# Patient Record
Sex: MALE | Race: BLACK OR AFRICAN AMERICAN | NOT HISPANIC OR LATINO | URBAN - METROPOLITAN AREA
[De-identification: names, ages, dates, MRNs, and addresses within clinical notes are randomized per-mention and may not be internally consistent; named-entity substitution may affect disease eponyms.]

---

## 2019-04-07 ENCOUNTER — EMERGENCY (EMERGENCY)
Facility: HOSPITAL | Age: 74
LOS: 1 days | Discharge: HOME | End: 2019-04-07
Attending: STUDENT IN AN ORGANIZED HEALTH CARE EDUCATION/TRAINING PROGRAM
Payer: MEDICARE

## 2019-04-07 VITALS
SYSTOLIC BLOOD PRESSURE: 178 MMHG | DIASTOLIC BLOOD PRESSURE: 71 MMHG | HEART RATE: 67 BPM | TEMPERATURE: 98 F | RESPIRATION RATE: 18 BRPM | OXYGEN SATURATION: 93 %

## 2019-04-07 LAB
ALBUMIN SERPL ELPH-MCNC: 4.1 G/DL — SIGNIFICANT CHANGE UP (ref 3.3–5.2)
ALP SERPL-CCNC: 64 U/L — SIGNIFICANT CHANGE UP (ref 40–120)
ALT FLD-CCNC: 17 U/L — SIGNIFICANT CHANGE UP
ANION GAP SERPL CALC-SCNC: 12 MMOL/L — SIGNIFICANT CHANGE UP (ref 5–17)
AST SERPL-CCNC: 14 U/L — SIGNIFICANT CHANGE UP
BILIRUB SERPL-MCNC: <0.2 MG/DL — LOW (ref 0.4–2)
BUN SERPL-MCNC: 32 MG/DL — HIGH (ref 8–20)
CALCIUM SERPL-MCNC: 9.3 MG/DL — SIGNIFICANT CHANGE UP (ref 8.6–10.2)
CHLORIDE SERPL-SCNC: 107 MMOL/L — SIGNIFICANT CHANGE UP (ref 98–107)
CO2 SERPL-SCNC: 22 MMOL/L — SIGNIFICANT CHANGE UP (ref 22–29)
CREAT SERPL-MCNC: 1.89 MG/DL — HIGH (ref 0.5–1.3)
GLUCOSE SERPL-MCNC: 130 MG/DL — HIGH (ref 70–115)
HCT VFR BLD CALC: 32.7 % — LOW (ref 42–52)
HGB BLD-MCNC: 10.6 G/DL — LOW (ref 14–18)
MCHC RBC-ENTMCNC: 29.4 PG — SIGNIFICANT CHANGE UP (ref 27–31)
MCHC RBC-ENTMCNC: 32.4 G/DL — SIGNIFICANT CHANGE UP (ref 32–36)
MCV RBC AUTO: 90.6 FL — SIGNIFICANT CHANGE UP (ref 80–94)
PLATELET # BLD AUTO: 239 K/UL — SIGNIFICANT CHANGE UP (ref 150–400)
POTASSIUM SERPL-MCNC: 4.2 MMOL/L — SIGNIFICANT CHANGE UP (ref 3.5–5.3)
POTASSIUM SERPL-SCNC: 4.2 MMOL/L — SIGNIFICANT CHANGE UP (ref 3.5–5.3)
PROT SERPL-MCNC: 6.6 G/DL — SIGNIFICANT CHANGE UP (ref 6.6–8.7)
RBC # BLD: 3.61 M/UL — LOW (ref 4.6–6.2)
RBC # FLD: 16.2 % — HIGH (ref 11–15.6)
SODIUM SERPL-SCNC: 141 MMOL/L — SIGNIFICANT CHANGE UP (ref 135–145)
WBC # BLD: 6.4 K/UL — SIGNIFICANT CHANGE UP (ref 4.8–10.8)
WBC # FLD AUTO: 6.4 K/UL — SIGNIFICANT CHANGE UP (ref 4.8–10.8)

## 2019-04-07 PROCEDURE — 99284 EMERGENCY DEPT VISIT MOD MDM: CPT

## 2019-04-07 RX ORDER — SODIUM CHLORIDE 9 MG/ML
1000 INJECTION INTRAMUSCULAR; INTRAVENOUS; SUBCUTANEOUS ONCE
Qty: 0 | Refills: 0 | Status: COMPLETED | OUTPATIENT
Start: 2019-04-07 | End: 2019-04-07

## 2019-04-07 NOTE — ED ADULT NURSE NOTE - OBJECTIVE STATEMENT
pt c/o fall, pt was sitting up sleeping at side of bed, fell out of bed, pt landed on right side, abraison  note to left forehead, unknown loc, pt c/o pain to right side.

## 2019-04-07 NOTE — ED ADULT TRIAGE NOTE - CHIEF COMPLAINT QUOTE
c/o sitting edge of the bed and fall out of the bed yesterday am , denies +loc, abrasion to forehead, c/o right sided arm pain, and back pain

## 2019-04-07 NOTE — ED ADULT NURSE NOTE - NSIMPLEMENTINTERV_GEN_ALL_ED
Implemented All Fall Risk Interventions:  [place] to call system. Call bell, personal items and telephone within reach. Instruct patient to call for assistance. Room bathroom lighting operational. Non-slip footwear when patient is off stretcher. Physically safe environment: no spills, clutter or unnecessary equipment. Stretcher in lowest position, wheels locked, appropriate side rails in place. Provide visual cue, wrist band, yellow gown, etc. Monitor gait and stability. Monitor for mental status changes and reorient to person, place, and time. Review medications for side effects contributing to fall risk. Reinforce activity limits and safety measures with patient and family.

## 2019-04-08 VITALS
SYSTOLIC BLOOD PRESSURE: 149 MMHG | OXYGEN SATURATION: 97 % | HEART RATE: 77 BPM | RESPIRATION RATE: 18 BRPM | DIASTOLIC BLOOD PRESSURE: 66 MMHG | TEMPERATURE: 98 F

## 2019-04-08 LAB
ANISOCYTOSIS BLD QL: SLIGHT — SIGNIFICANT CHANGE UP
APTT BLD: 22 SEC — LOW (ref 27.5–36.3)
BLD GP AB SCN SERPL QL: SIGNIFICANT CHANGE UP
EOSINOPHIL NFR BLD AUTO: 4 % — SIGNIFICANT CHANGE UP (ref 0–6)
HYPOCHROMIA BLD QL: SLIGHT — SIGNIFICANT CHANGE UP
INR BLD: 0.95 RATIO — SIGNIFICANT CHANGE UP (ref 0.88–1.16)
LYMPHOCYTES # BLD AUTO: 18 % — LOW (ref 20–55)
MACROCYTES BLD QL: SLIGHT — SIGNIFICANT CHANGE UP
MICROCYTES BLD QL: SLIGHT — SIGNIFICANT CHANGE UP
MONOCYTES NFR BLD AUTO: 8 % — SIGNIFICANT CHANGE UP (ref 3–10)
NEUTROPHILS NFR BLD AUTO: 70 % — SIGNIFICANT CHANGE UP (ref 37–73)
PLAT MORPH BLD: NORMAL — SIGNIFICANT CHANGE UP
POIKILOCYTOSIS BLD QL AUTO: SLIGHT — SIGNIFICANT CHANGE UP
PROTHROM AB SERPL-ACNC: 10.9 SEC — SIGNIFICANT CHANGE UP (ref 10–12.9)
RBC BLD AUTO: ABNORMAL
TYPE + AB SCN PNL BLD: SIGNIFICANT CHANGE UP

## 2019-04-08 PROCEDURE — 93010 ELECTROCARDIOGRAM REPORT: CPT

## 2019-04-08 PROCEDURE — 96361 HYDRATE IV INFUSION ADD-ON: CPT

## 2019-04-08 PROCEDURE — 70450 CT HEAD/BRAIN W/O DYE: CPT | Mod: 26

## 2019-04-08 PROCEDURE — 74176 CT ABD & PELVIS W/O CONTRAST: CPT

## 2019-04-08 PROCEDURE — 86901 BLOOD TYPING SEROLOGIC RH(D): CPT

## 2019-04-08 PROCEDURE — 85730 THROMBOPLASTIN TIME PARTIAL: CPT

## 2019-04-08 PROCEDURE — 85610 PROTHROMBIN TIME: CPT

## 2019-04-08 PROCEDURE — 70450 CT HEAD/BRAIN W/O DYE: CPT

## 2019-04-08 PROCEDURE — 36415 COLL VENOUS BLD VENIPUNCTURE: CPT

## 2019-04-08 PROCEDURE — 72125 CT NECK SPINE W/O DYE: CPT

## 2019-04-08 PROCEDURE — 72125 CT NECK SPINE W/O DYE: CPT | Mod: 26

## 2019-04-08 PROCEDURE — 96374 THER/PROPH/DIAG INJ IV PUSH: CPT

## 2019-04-08 PROCEDURE — 71250 CT THORAX DX C-: CPT

## 2019-04-08 PROCEDURE — 99284 EMERGENCY DEPT VISIT MOD MDM: CPT | Mod: 25

## 2019-04-08 PROCEDURE — 74176 CT ABD & PELVIS W/O CONTRAST: CPT | Mod: 26

## 2019-04-08 PROCEDURE — 86850 RBC ANTIBODY SCREEN: CPT

## 2019-04-08 PROCEDURE — 86900 BLOOD TYPING SEROLOGIC ABO: CPT

## 2019-04-08 PROCEDURE — 71250 CT THORAX DX C-: CPT | Mod: 26

## 2019-04-08 PROCEDURE — 93005 ELECTROCARDIOGRAM TRACING: CPT

## 2019-04-08 PROCEDURE — 85027 COMPLETE CBC AUTOMATED: CPT

## 2019-04-08 PROCEDURE — 80053 COMPREHEN METABOLIC PANEL: CPT

## 2019-04-08 RX ORDER — OXYCODONE AND ACETAMINOPHEN 5; 325 MG/1; MG/1
1 TABLET ORAL ONCE
Qty: 0 | Refills: 0 | Status: DISCONTINUED | OUTPATIENT
Start: 2019-04-08 | End: 2019-04-08

## 2019-04-08 RX ORDER — MORPHINE SULFATE 50 MG/1
4 CAPSULE, EXTENDED RELEASE ORAL ONCE
Qty: 0 | Refills: 0 | Status: DISCONTINUED | OUTPATIENT
Start: 2019-04-08 | End: 2019-04-08

## 2019-04-08 RX ADMIN — SODIUM CHLORIDE 1000 MILLILITER(S): 9 INJECTION INTRAMUSCULAR; INTRAVENOUS; SUBCUTANEOUS at 01:25

## 2019-04-08 RX ADMIN — SODIUM CHLORIDE 1000 MILLILITER(S): 9 INJECTION INTRAMUSCULAR; INTRAVENOUS; SUBCUTANEOUS at 00:25

## 2019-04-08 RX ADMIN — OXYCODONE AND ACETAMINOPHEN 1 TABLET(S): 5; 325 TABLET ORAL at 05:37

## 2019-04-08 RX ADMIN — MORPHINE SULFATE 4 MILLIGRAM(S): 50 CAPSULE, EXTENDED RELEASE ORAL at 02:31

## 2019-04-08 NOTE — ED PROVIDER NOTE - OBJECTIVE STATEMENT
PT with SPMHX of multiple myeloma, HTN, CKD, presents to the ED with complaint of fall yesterday. PT states that he was sitting in bed and fell asleep sitting up and fell over hitting his head on his night stand. PT states that he had a sudden onset of pain in his Rt arm and shoulder and since then has been having increasing amount of chest and abd pain that is dull and painful. PT states that they have not done anything for the pain prior to coming to the ED. PT states that pain starts in his Neck and radiates to his Rt arm. PT states that all pan are mild in nature. PT denies fever, chills, weakness, numbness, tingling, HA, dizziness, cough, SOB, diff breathing, back pain, LOC.

## 2019-04-08 NOTE — ED PROVIDER NOTE - CARE PLAN
Principal Discharge DX:	Minor head injury  Secondary Diagnosis:	Back pain  Secondary Diagnosis:	Abdominal pain

## 2019-04-08 NOTE — ED PROVIDER NOTE - WEIGHT BEARING
Recommended modifications/Purpose of the nutrition education/Priority modifications/Survival information/Nutrition relationship to health/disease/Discussed need for adequate nutrition for survival. Encouraged Pt to abstain from alcohol use. able

## 2019-04-08 NOTE — ED PROVIDER NOTE - PROGRESS NOTE DETAILS
PT informed of kidney function, pt states that this is similar to baseline. PT informed of kidney function, pt states that this is similar to baseline. PT informed of all incidental CT findings and the importance to follow up to private oncologist.

## 2019-04-08 NOTE — ED PROVIDER NOTE - CLINICAL SUMMARY MEDICAL DECISION MAKING FREE TEXT BOX
PT with stable VS, no acute distress, non toxic appearing, tolerating PO in the ED, pt dale intact, no acute findings, pain controlled. pt will be DC home with follow up to PCP, educated about when to return to the ED if needed. PT verbalizes that he understands all instructions and results. Pt educated about the risks vs benefits of imaging at this time and agrees that not warranted for their symptoms, and PE.

## 2019-04-08 NOTE — ED PROVIDER NOTE - ATTENDING CONTRIBUTION TO CARE
75 yo male presents for evaluation of injuries from fall. I personally saw the patient with the PA, and completed the key components of the history and physical exam. I then discussed the management plan with the PA.

## 2019-04-08 NOTE — ED PROVIDER NOTE - NS ED ROS FT
ROS: CONTUSIONAL: Denies fever, chills, fatigue, wt loss. HEAD: Denies trauma, HA, Dizziness. EYE: Denies Acute visual changes, diplopia. ENMT: Denies change in hearing, tinnitus, epistaxis, difficulty swallowing, sore throat. CARDIO: Denies CP, palpitations, edema. RESP: Denies Cough, SOB , Diff breathing, hemoptysis. GI: Denies N/V, ABD pain, change in bowel movement. URINARY: Denies difficulty urinating, pelvic pain. MS:  Denies, weakness, decreased ROM, swelling. NEURO: Denies change in gait, seizures, loss of sensation, dizziness, confusion LOC.  PSY: NO SI/HI.

## 2019-08-28 ENCOUNTER — INPATIENT (INPATIENT)
Facility: HOSPITAL | Age: 74
LOS: 0 days | Discharge: HOME | DRG: 309 | End: 2019-08-29
Attending: HOSPITALIST | Admitting: HOSPITALIST
Payer: MEDICARE

## 2019-08-28 VITALS
RESPIRATION RATE: 18 BRPM | SYSTOLIC BLOOD PRESSURE: 93 MMHG | DIASTOLIC BLOOD PRESSURE: 54 MMHG | HEART RATE: 39 BPM | OXYGEN SATURATION: 96 %

## 2019-08-28 DIAGNOSIS — R00.1 BRADYCARDIA, UNSPECIFIED: ICD-10-CM

## 2019-08-28 DIAGNOSIS — I48.91 UNSPECIFIED ATRIAL FIBRILLATION: ICD-10-CM

## 2019-08-28 DIAGNOSIS — I10 ESSENTIAL (PRIMARY) HYPERTENSION: ICD-10-CM

## 2019-08-28 PROBLEM — N18.9 CHRONIC KIDNEY DISEASE, UNSPECIFIED: Chronic | Status: ACTIVE | Noted: 2019-04-08

## 2019-08-28 PROBLEM — C90.00 MULTIPLE MYELOMA NOT HAVING ACHIEVED REMISSION: Chronic | Status: ACTIVE | Noted: 2019-04-08

## 2019-08-28 LAB
ALBUMIN SERPL ELPH-MCNC: 3.3 G/DL — SIGNIFICANT CHANGE UP (ref 3.3–5.2)
ALP SERPL-CCNC: 54 U/L — SIGNIFICANT CHANGE UP (ref 40–120)
ALT FLD-CCNC: 19 U/L — SIGNIFICANT CHANGE UP
ANION GAP SERPL CALC-SCNC: 11 MMOL/L — SIGNIFICANT CHANGE UP (ref 5–17)
ANION GAP SERPL CALC-SCNC: 11 MMOL/L — SIGNIFICANT CHANGE UP (ref 5–17)
ANISOCYTOSIS BLD QL: SLIGHT — SIGNIFICANT CHANGE UP
APTT BLD: 33.4 SEC — SIGNIFICANT CHANGE UP (ref 27.5–36.3)
AST SERPL-CCNC: 20 U/L — SIGNIFICANT CHANGE UP
BASOPHILS # BLD AUTO: 0 K/UL — SIGNIFICANT CHANGE UP (ref 0–0.2)
BASOPHILS NFR BLD AUTO: 0 % — SIGNIFICANT CHANGE UP (ref 0–2)
BILIRUB SERPL-MCNC: 0.2 MG/DL — LOW (ref 0.4–2)
BLD GP AB SCN SERPL QL: SIGNIFICANT CHANGE UP
BUN SERPL-MCNC: 37 MG/DL — HIGH (ref 8–20)
BUN SERPL-MCNC: 39 MG/DL — HIGH (ref 8–20)
CALCIUM SERPL-MCNC: 7.8 MG/DL — LOW (ref 8.6–10.2)
CALCIUM SERPL-MCNC: 8.2 MG/DL — LOW (ref 8.6–10.2)
CHLORIDE SERPL-SCNC: 104 MMOL/L — SIGNIFICANT CHANGE UP (ref 98–107)
CHLORIDE SERPL-SCNC: 106 MMOL/L — SIGNIFICANT CHANGE UP (ref 98–107)
CO2 SERPL-SCNC: 21 MMOL/L — LOW (ref 22–29)
CO2 SERPL-SCNC: 22 MMOL/L — SIGNIFICANT CHANGE UP (ref 22–29)
CREAT SERPL-MCNC: 2.26 MG/DL — HIGH (ref 0.5–1.3)
CREAT SERPL-MCNC: 2.42 MG/DL — HIGH (ref 0.5–1.3)
DACRYOCYTES BLD QL SMEAR: SLIGHT — SIGNIFICANT CHANGE UP
ELLIPTOCYTES BLD QL SMEAR: SLIGHT — SIGNIFICANT CHANGE UP
EOSINOPHIL # BLD AUTO: 0.16 K/UL — SIGNIFICANT CHANGE UP (ref 0–0.5)
EOSINOPHIL NFR BLD AUTO: 2.6 % — SIGNIFICANT CHANGE UP (ref 0–6)
GIANT PLATELETS BLD QL SMEAR: PRESENT — SIGNIFICANT CHANGE UP
GLUCOSE SERPL-MCNC: 138 MG/DL — HIGH (ref 70–115)
GLUCOSE SERPL-MCNC: 181 MG/DL — HIGH (ref 70–115)
HCT VFR BLD CALC: 28.7 % — LOW (ref 39–50)
HGB BLD-MCNC: 9.2 G/DL — LOW (ref 13–17)
INR BLD: 1.39 RATIO — HIGH (ref 0.88–1.16)
LYMPHOCYTES # BLD AUTO: 0.64 K/UL — LOW (ref 1–3.3)
LYMPHOCYTES # BLD AUTO: 10.5 % — LOW (ref 13–44)
MACROCYTES BLD QL: SLIGHT — SIGNIFICANT CHANGE UP
MANUAL SMEAR VERIFICATION: SIGNIFICANT CHANGE UP
MCHC RBC-ENTMCNC: 29.3 PG — SIGNIFICANT CHANGE UP (ref 27–34)
MCHC RBC-ENTMCNC: 32.1 GM/DL — SIGNIFICANT CHANGE UP (ref 32–36)
MCV RBC AUTO: 91.4 FL — SIGNIFICANT CHANGE UP (ref 80–100)
MONOCYTES # BLD AUTO: 0.32 K/UL — SIGNIFICANT CHANGE UP (ref 0–0.9)
MONOCYTES NFR BLD AUTO: 5.3 % — SIGNIFICANT CHANGE UP (ref 2–14)
NEUTROPHILS # BLD AUTO: 5 K/UL — SIGNIFICANT CHANGE UP (ref 1.8–7.4)
NEUTROPHILS NFR BLD AUTO: 79.8 % — HIGH (ref 43–77)
NEUTS BAND # BLD: 1.8 % — SIGNIFICANT CHANGE UP (ref 0–8)
OVALOCYTES BLD QL SMEAR: SLIGHT — SIGNIFICANT CHANGE UP
PLAT MORPH BLD: NORMAL — SIGNIFICANT CHANGE UP
PLATELET # BLD AUTO: 226 K/UL — SIGNIFICANT CHANGE UP (ref 150–400)
POIKILOCYTOSIS BLD QL AUTO: SLIGHT — SIGNIFICANT CHANGE UP
POLYCHROMASIA BLD QL SMEAR: SLIGHT — SIGNIFICANT CHANGE UP
POTASSIUM SERPL-MCNC: 5.1 MMOL/L — SIGNIFICANT CHANGE UP (ref 3.5–5.3)
POTASSIUM SERPL-MCNC: 5.5 MMOL/L — HIGH (ref 3.5–5.3)
POTASSIUM SERPL-SCNC: 5.1 MMOL/L — SIGNIFICANT CHANGE UP (ref 3.5–5.3)
POTASSIUM SERPL-SCNC: 5.5 MMOL/L — HIGH (ref 3.5–5.3)
PROT SERPL-MCNC: 6 G/DL — LOW (ref 6.6–8.7)
PROTHROM AB SERPL-ACNC: 16.2 SEC — HIGH (ref 10–12.9)
RBC # BLD: 3.14 M/UL — LOW (ref 4.2–5.8)
RBC # FLD: 16.7 % — HIGH (ref 10.3–14.5)
RBC BLD AUTO: ABNORMAL
SODIUM SERPL-SCNC: 137 MMOL/L — SIGNIFICANT CHANGE UP (ref 135–145)
SODIUM SERPL-SCNC: 138 MMOL/L — SIGNIFICANT CHANGE UP (ref 135–145)
TROPONIN T SERPL-MCNC: 0.03 NG/ML — SIGNIFICANT CHANGE UP (ref 0–0.06)
TSH SERPL-MCNC: 1.99 UIU/ML — SIGNIFICANT CHANGE UP (ref 0.27–4.2)
WBC # BLD: 6.13 K/UL — SIGNIFICANT CHANGE UP (ref 3.8–10.5)
WBC # FLD AUTO: 6.13 K/UL — SIGNIFICANT CHANGE UP (ref 3.8–10.5)

## 2019-08-28 PROCEDURE — 70450 CT HEAD/BRAIN W/O DYE: CPT | Mod: 26

## 2019-08-28 PROCEDURE — 93010 ELECTROCARDIOGRAM REPORT: CPT

## 2019-08-28 PROCEDURE — 72125 CT NECK SPINE W/O DYE: CPT | Mod: 26

## 2019-08-28 PROCEDURE — 99223 1ST HOSP IP/OBS HIGH 75: CPT

## 2019-08-28 PROCEDURE — 99291 CRITICAL CARE FIRST HOUR: CPT

## 2019-08-28 PROCEDURE — 71045 X-RAY EXAM CHEST 1 VIEW: CPT | Mod: 26

## 2019-08-28 PROCEDURE — 99223 1ST HOSP IP/OBS HIGH 75: CPT | Mod: AI

## 2019-08-28 RX ORDER — GLUCAGON INJECTION, SOLUTION 0.5 MG/.1ML
1 INJECTION, SOLUTION SUBCUTANEOUS ONCE
Refills: 0 | Status: DISCONTINUED | OUTPATIENT
Start: 2019-08-28 | End: 2019-08-29

## 2019-08-28 RX ORDER — ENOXAPARIN SODIUM 100 MG/ML
120 INJECTION SUBCUTANEOUS EVERY 12 HOURS
Refills: 0 | Status: DISCONTINUED | OUTPATIENT
Start: 2019-08-28 | End: 2019-08-28

## 2019-08-28 RX ORDER — ACYCLOVIR SODIUM 500 MG
400 VIAL (EA) INTRAVENOUS
Qty: 0 | Refills: 0 | DISCHARGE

## 2019-08-28 RX ORDER — DEXAMETHASONE 0.5 MG/5ML
20 ELIXIR ORAL
Qty: 0 | Refills: 0 | DISCHARGE

## 2019-08-28 RX ORDER — SODIUM CHLORIDE 9 MG/ML
1000 INJECTION INTRAMUSCULAR; INTRAVENOUS; SUBCUTANEOUS ONCE
Refills: 0 | Status: COMPLETED | OUTPATIENT
Start: 2019-08-28 | End: 2019-08-28

## 2019-08-28 RX ORDER — POMALIDOMIDE 1 MG/1
1 CAPSULE ORAL
Qty: 0 | Refills: 0 | DISCHARGE

## 2019-08-28 RX ORDER — ROSUVASTATIN CALCIUM 5 MG/1
1 TABLET ORAL
Qty: 0 | Refills: 0 | DISCHARGE

## 2019-08-28 RX ORDER — INSULIN LISPRO 100/ML
VIAL (ML) SUBCUTANEOUS
Refills: 0 | Status: DISCONTINUED | OUTPATIENT
Start: 2019-08-28 | End: 2019-08-29

## 2019-08-28 RX ORDER — ROSUVASTATIN CALCIUM 5 MG/1
0 TABLET ORAL
Qty: 0 | Refills: 0 | DISCHARGE

## 2019-08-28 RX ORDER — ATROPINE SULFATE 0.1 MG/ML
0.5 SYRINGE (ML) INJECTION ONCE
Refills: 0 | Status: COMPLETED | OUTPATIENT
Start: 2019-08-28 | End: 2019-08-28

## 2019-08-28 RX ORDER — APIXABAN 2.5 MG/1
0 TABLET, FILM COATED ORAL
Qty: 0 | Refills: 0 | DISCHARGE

## 2019-08-28 RX ORDER — DEXTROSE 50 % IN WATER 50 %
25 SYRINGE (ML) INTRAVENOUS ONCE
Refills: 0 | Status: DISCONTINUED | OUTPATIENT
Start: 2019-08-28 | End: 2019-08-29

## 2019-08-28 RX ORDER — SODIUM CHLORIDE 9 MG/ML
1000 INJECTION, SOLUTION INTRAVENOUS
Refills: 0 | Status: DISCONTINUED | OUTPATIENT
Start: 2019-08-28 | End: 2019-08-29

## 2019-08-28 RX ORDER — ENOXAPARIN SODIUM 100 MG/ML
120 INJECTION SUBCUTANEOUS
Refills: 0 | Status: DISCONTINUED | OUTPATIENT
Start: 2019-08-28 | End: 2019-08-29

## 2019-08-28 RX ORDER — OMEPRAZOLE 10 MG/1
1 CAPSULE, DELAYED RELEASE ORAL
Qty: 0 | Refills: 0 | DISCHARGE

## 2019-08-28 RX ORDER — DEXTROSE 50 % IN WATER 50 %
15 SYRINGE (ML) INTRAVENOUS ONCE
Refills: 0 | Status: DISCONTINUED | OUTPATIENT
Start: 2019-08-28 | End: 2019-08-29

## 2019-08-28 RX ORDER — DEXTROSE 50 % IN WATER 50 %
12.5 SYRINGE (ML) INTRAVENOUS ONCE
Refills: 0 | Status: DISCONTINUED | OUTPATIENT
Start: 2019-08-28 | End: 2019-08-29

## 2019-08-28 RX ORDER — ACETAMINOPHEN 500 MG
500 TABLET ORAL
Qty: 0 | Refills: 0 | DISCHARGE

## 2019-08-28 RX ORDER — FLUTICASONE FUROATE AND VILANTEROL TRIFENATATE 100; 25 UG/1; UG/1
0 POWDER RESPIRATORY (INHALATION)
Qty: 0 | Refills: 0 | DISCHARGE

## 2019-08-28 RX ORDER — ATORVASTATIN CALCIUM 80 MG/1
80 TABLET, FILM COATED ORAL AT BEDTIME
Refills: 0 | Status: DISCONTINUED | OUTPATIENT
Start: 2019-08-28 | End: 2019-08-29

## 2019-08-28 RX ORDER — ASPIRIN/CALCIUM CARB/MAGNESIUM 324 MG
1 TABLET ORAL
Qty: 0 | Refills: 0 | DISCHARGE

## 2019-08-28 RX ORDER — ENOXAPARIN SODIUM 100 MG/ML
120 INJECTION SUBCUTANEOUS DAILY
Refills: 0 | Status: DISCONTINUED | OUTPATIENT
Start: 2019-08-28 | End: 2019-08-28

## 2019-08-28 RX ORDER — SITAGLIPTIN 50 MG/1
1 TABLET, FILM COATED ORAL
Qty: 0 | Refills: 0 | DISCHARGE

## 2019-08-28 RX ORDER — GABAPENTIN 400 MG/1
300 CAPSULE ORAL
Qty: 0 | Refills: 0 | DISCHARGE

## 2019-08-28 RX ADMIN — SODIUM CHLORIDE 1000 MILLILITER(S): 9 INJECTION INTRAMUSCULAR; INTRAVENOUS; SUBCUTANEOUS at 10:42

## 2019-08-28 RX ADMIN — ATORVASTATIN CALCIUM 80 MILLIGRAM(S): 80 TABLET, FILM COATED ORAL at 23:08

## 2019-08-28 RX ADMIN — Medication 0.5 MILLIGRAM(S): at 10:38

## 2019-08-28 NOTE — ED PROVIDER NOTE - CARE PLAN
Principal Discharge DX:	Symptomatic bradycardia  Secondary Diagnosis:	Atrial fibrillation with slow ventricular response

## 2019-08-28 NOTE — ED ADULT TRIAGE NOTE - CHIEF COMPLAINT QUOTE
Pt comes in for symptomatic bradycardia, states he has been weak x 3 days, syncopized this morning and was AMS, HR 38 upon EMS arrival, given 0.5mg atropine by EMS, HR 70 upon ED arrival, pt now AOx3

## 2019-08-28 NOTE — ED ADULT NURSE REASSESSMENT NOTE - NS ED NURSE REASSESS COMMENT FT1
Assumed patient care at 1137, report received from previous RN, charting as noted. Patient A&Ox4, complaining of feeling tired. Denies any chest pain, shortness of breath, nausea or dizziness. Cardiac monitor in place. Bradycardic. Respirations even & unlabored, denies any numbness or tingling. Saline lock in place, patent, negative s/s phlebitis or infiltration. IVF in progress, well tolerated. Oriented to call bell system, plan of care discussed, all questions answered. Will monitor.

## 2019-08-28 NOTE — CONSULT NOTE ADULT - SUBJECTIVE AND OBJECTIVE BOX
Electrophysiology Attending Consult Note    Cardiologist: Dr. Cedric Bledsoe  Electrophysiologist: Dr. Cm Damon    HPI:  Soren Fuentes is a 74 year old male with DM 2, paroxysmal AF (on Apixaban), hypertension, CKD (Cr 1.63-2.65), hyperparathyroidism, multiple myeloma s/p stem cell transplant/XRT, sickle cell trait, prostate cancer status post surgery who presented with syncope and bradycardia for which EP is consulted.    The patient states he woke up this morning and was sitting at the edge of the bed. He took his medications and the next thing he remembers is waking up on the floor with EMS assessing him. He denies having any chest pain, palpitations, dizziness, lightheadedness, dyspnea, fevers, chills, cough, nausea, vomiting, diarrhea, dysuria, hematuria, hematochezia, or melena. He does note that over the past three days he has had increased fatigue and tiredness. He manages his medications on his own and has them in a pill box. He does not feel he has taken any more medications than he is prescribed.    At his house, ECG by EMS reveals atrial flutter with slow ventricular response with HR to the 30-40s. He was given IV Atropine 0.5mg with minimal effect on his rate. Upon arrival to the ER, he was noted to be in AF with slow ventricular response and was given an additional IV Atropine 0.5mg x1 with minimal improvement in HR to the 50s.    PAST MEDICAL & SURGICAL HISTORY:  Diabetes type 2, controlled  Afib  HTN (hypertension)  Multiple myeloma  CKD (chronic kidney disease)  No significant past surgical history    REVIEW OF SYSTEMS:  CONSTITUTIONAL: No fever, + fatigue  EYES: No eye pain, visual disturbances  ENMT:  No changes in hearing, +rhinorrhea  NECK: No pain or stiffness  RESPIRATORY: No cough, wheezing, chills or hemoptysis; No shortness of breath  CARDIOVASCULAR: No chest pain, palpitations, dizziness, or leg swelling  GASTROINTESTINAL: No abdominal or epigastric pain. No nausea, vomiting, or hematemesis; No diarrhea or constipation. No melena or hematochezia.  GENITOURINARY: No dysuria, frequency, hematuria, or incontinence  NEUROLOGICAL: No headaches, memory loss, loss of strength, numbness, or tremors  SKIN: No itching, burning, rashes, or lesions   LYMPH NODES: No enlarged glands  ENDOCRINE: No heat or cold intolerance; No hair loss  MUSCULOSKELETAL: No joint pain or swelling; No muscle, back, or extremity pain  PSYCHIATRIC: No depression, anxiety, mood swings, or difficulty sleeping  HEME/LYMPH: No easy bruising, or bleeding gums  ALLERY AND IMMUNOLOGIC: No hives or eczema      MEDICATIONS  (STANDING):    MEDICATIONS  (PRN):      Allergies: lisinopril (Angioedema)    SOCIAL HISTORY:  Former tobacco use    FAMILY HISTORY:      Vital Signs Last 24 Hrs  T(C): 36.9 (28 Aug 2019 10:55), Max: 36.9 (28 Aug 2019 10:55)  T(F): 98.4 (28 Aug 2019 10:55), Max: 98.4 (28 Aug 2019 10:55)  HR: 53 (28 Aug 2019 11:16) (39 - 53)  BP: 96/68 (28 Aug 2019 11:16) (93/46 - 96/68)  BP(mean): --  RR: 18 (28 Aug 2019 11:16) (18 - 18)  SpO2: 100% (28 Aug 2019 11:16) (96% - 100%)    Physical Exam:  Constitutional: AAOx3, NAD  Neck: supple, No JVD  Cardiovascular: +S1S2 RRR, no murmurs, rubs, gallops   Pulmonary: CTA b/l, unlabored, no wheezes, rales. rhonci  Abdomen: +BS, soft NTND  Extremities: no edema b/l, +distal pulses b/l  Neuro: non focal, speech clear, MURCIA x 4    LABS:                        9.2    6.13  )-----------( 226      ( 28 Aug 2019 10:51 )             28.7   08-28    137  |  104  |  39.0<H>  ----------------------------<  181<H>  5.5<H>   |  22.0  |  2.42<H>    Ca    8.2<L>      28 Aug 2019 10:51    TPro  6.0<L>  /  Alb  3.3  /  TBili  0.2<L>  /  DBili  x   /  AST  20  /  ALT  19  /  AlkPhos  54  08-28  LIVER FUNCTIONS - ( 28 Aug 2019 10:51 )  Alb: 3.3 g/dL / Pro: 6.0 g/dL / ALK PHOS: 54 U/L / ALT: 19 U/L / AST: 20 U/L / GGT: x           PT/INR - ( 28 Aug 2019 10:51 )   PT: 16.2 sec;   INR: 1.39 ratio         PTT - ( 28 Aug 2019 10:51 )  PTT:33.4 sec        RADIOLOGY & ADDITIONAL STUDIES: Electrophysiology Attending Consult Note    Cardiologist: Dr. Cedric Bledsoe  Electrophysiologist: Dr. Cm Damon    HPI:  Soren Fuentes is a 74 year old male with DM 2, paroxysmal AF (on Apixaban), hypertension, CKD (Cr 1.63-2.65), hyperparathyroidism, multiple myeloma s/p stem cell transplant/XRT, sickle cell trait, prostate cancer status post surgery who presented with syncope and bradycardia for which EP is consulted.    The patient states he woke up this morning and was sitting at the edge of the bed. He took his medications and the next thing he remembers is waking up on the floor with EMS assessing him. He denies having any chest pain, palpitations, dizziness, lightheadedness, dyspnea, fevers, chills, cough, nausea, vomiting, diarrhea, dysuria, hematuria, hematochezia, or melena. He does note that over the past three days he has had increased fatigue and tiredness. He manages his medications on his own and has them in a pill box. He does not feel he has taken any more medications than he is prescribed.    At his house, ECG by EMS reveals atrial flutter with slow ventricular response with HR to the 30-40s. He was given IV Atropine 0.5mg with minimal effect on his rate. Upon arrival to the ER, he was noted to be in AF with slow ventricular response and was given an additional IV Atropine 0.5mg x1 with minimal improvement in HR to the 50s.    PAST MEDICAL & SURGICAL HISTORY:  Diabetes type 2, controlled  Afib  HTN (hypertension)  Multiple myeloma  CKD (chronic kidney disease)  No significant past surgical history    REVIEW OF SYSTEMS:  CONSTITUTIONAL: No fever, + fatigue, + sweats  EYES: No eye pain, visual disturbances  ENMT:  No changes in hearing, +rhinorrhea  NECK: No pain or stiffness  RESPIRATORY: No cough, wheezing, chills or hemoptysis; No shortness of breath  CARDIOVASCULAR: See HPI.  GASTROINTESTINAL: No abdominal or epigastric pain. No nausea, vomiting, or hematemesis; No diarrhea. No melena or hematochezia.  GENITOURINARY: No dysuria, hematuria  NEUROLOGICAL: No loss of strength, numbness  SKIN: No itching, burning, rashes, or lesions   ENDOCRINE: No heat or cold intolerance  MUSCULOSKELETAL: + muscle/back pain  PSYCHIATRIC: No depression, anxiety  HEME/LYMPH: No easy bruising, or bleeding gums      HOME MEDICATIONS:  Breo Ellipta:  (28 Aug 2019 11:10)  Crestor: 20 (28 Aug 2019 11:12)  Eliquis 5 mg oral tablet: BID (28 Aug 2019 11:09)  furosemide 40 mg oral tablet: 2 tablets BID (28 Aug 2019 11:11)  Januvia:  (28 Aug 2019 11:12)  losartan:  100mg QD (28 Aug 2019 11:13)  metoprolol succ: 50mg QD (28 Aug 2019 11:11)  Neurontin 300 mg oral capsule: TID  (28 Aug 2019 11:10)  omeprazole:  40mg QD (28 Aug 2019 11:12)  oxyCODONE 10 mg oral tablet, extended release: 1 tab(s) orally QID (28 Aug 2019 11:10)  Nifedipine ER 90mg QD  KCL 10mEq 2tab BID  Acyclovir 400mg TID    Allergies: lisinopril (Angioedema)    SOCIAL HISTORY:  Former tobacco use  Former drug use (cocaine, marijuana)    FAMILY HISTORY:  "heart disease" in the family    Vital Signs Last 24 Hrs  T(C): 36.9 (28 Aug 2019 10:55), Max: 36.9 (28 Aug 2019 10:55)  T(F): 98.4 (28 Aug 2019 10:55), Max: 98.4 (28 Aug 2019 10:55)  HR: 53 (28 Aug 2019 11:16) (39 - 53)  BP: 96/68 (28 Aug 2019 11:16) (93/46 - 96/68)  BP(mean): --  RR: 18 (28 Aug 2019 11:16) (18 - 18)  SpO2: 100% (28 Aug 2019 11:16) (96% - 100%)    Physical Exam:  Appearance: Normal, well nourished  HEENT: Dry oral mucosa, EOMI, sclera non-icteric, pale conjunctiva  Lymphatic: No cervical lymphadenopathy  Cardiovascular: Normal S1 S2, No JVD, No cardiac murmurs, No carotid bruits, No peripheral edema  Respiratory: Lungs clear to auscultation	  Psychiatry: A & O x 3, Mood & affect appropriate  Gastrointestinal:  Soft, Non-tender, + BS, no bruits  Skin: No rashes, No ecchymoses, No cyanosis  Neurologic: Grossly non-focal with full strength in all four extremities  Extremities: Normal range of motion, No clubbing, cyanosis or edema  Vascular: Warm and well perfused    LABS:             9.2    6.13  )-----------( 226      ( 28 Aug 2019 10:51 )             28.7       137  |  104  |  39.0<H>  ----------------------------<  181<H>  5.5<H>   |  22.0  |  2.42<H>    Ca    8.2<L>      28 Aug 2019 10:51    TPro  6.0<L>  /  Alb  3.3  /  TBili  0.2<L>  /  DBili  x   /  AST  20  /  ALT  19  /  AlkPhos  54    LIVER FUNCTIONS - ( 28 Aug 2019 10:51 )  Alb: 3.3 g/dL / Pro: 6.0 g/dL / ALK PHOS: 54 U/L / ALT: 19 U/L / AST: 20 U/L / GGT: x           PT/INR - ( 28 Aug 2019 10:51 )   PT: 16.2 sec;   INR: 1.39 ratio       PTT - ( 28 Aug 2019 10:51 )  PTT:33.4 sec    RADIOLOGY & ADDITIONAL STUDIES:    ECG 19: AF with slow ventricular response at 48 bpm.     OS ([place]) Records:  TTE 2017: LVEF 78%. Moderate diastolic dysfunction. LAd 3.9cm.    Regadenoson NST 19: Resting HR of 65 bpm rising to 74 bpm (NSR). Normal perfusion without ischemia or infarction. LVEF: 66%.    EC19: AF at 74 bpm. Non-specific ST-T wave changes in lateral leads.  EC19: NSR at 73 bpm with 1st degree AVB.    19:  CMP:  146 / 4.6 | 108 / 24 | 32 / 2.53 < 136  ALT: 14, AST: 13, ALP: 53, LDH: 212    CBC: 4.8 > 10 / 31.4 < 166

## 2019-08-28 NOTE — CONSULT NOTE ADULT - SUBJECTIVE AND OBJECTIVE BOX
Patient is a 74y old  Male who presents with a chief complaint of syncope, bradycardia.    HPI:  73yo male with PMH Afib on Eliquis, HTN, HLD, CKD, CHF (unknown EF), chronic BLE edema, multiple myeloma brought to ED s/p syncopal episode.  Patient unaware of events, states family found him on floor and he woke up with EMS, AMS, bradycardic HR in 30s, EMS administered Atropine 0.5mg, ED gave 2nd dose on arrival d/t HR 30-40s hypotensive 90s/40s.  Patient A&Ox3, states he is always weak but has had increased weakness and SOB x3days. Follows closely with cardio Dr. Damon at [facility], PMD Dr. Trejo.  He states he is very good at organizing his meds in a pill box, and does not think he took extra Toprol, but feels he could use some help.  Recent decrease in PO intake, appears extremely dry. Denies chest pain/pressure, palpitations, irregular and/or rapid heart beat, dizziness, orthopnea, PND, cough, n/v/d, hematuria, or hematochezia.     PAST MEDICAL & SURGICAL HISTORY:  Diabetes type 2, controlled  Afib  HTN (hypertension)  Multiple myeloma  CKD (chronic kidney disease)  No significant past surgical history      PREVIOUS DIAGNOSTIC TESTING:  none documented      Allergies    lisinopril (Angioedema)    Intolerances        MEDICATIONS  (STANDING):    MEDICATIONS  (PRN):    Home Medications:  Breo Ellipta:  (28 Aug 2019 11:10)  Crestor: 20 (28 Aug 2019 11:12)  Eliquis 5 mg oral tablet: BID (28 Aug 2019 11:09)  furosemide 40 mg oral tablet: 2 tablets BID (28 Aug 2019 11:11)  Januvia:  (28 Aug 2019 11:12)  losartan:  100mg QD (28 Aug 2019 11:13)  metoprolol succ: 50mg QD (28 Aug 2019 11:11)  Neurontin 300 mg oral capsule: TID  (28 Aug 2019 11:10)  omeprazole:  40mg QD (28 Aug 2019 11:12)  oxyCODONE 10 mg oral tablet, extended release: 1 tab(s) orally QID (28 Aug 2019 11:10)  Nifedipine ER 90mg QD  KCL 10mEq 2tab BID  Acyclovir 400mg TID    FAMILY HISTORY:  strong family Hx of Heart disease    SOCIAL HISTORY:    CIGARETTES: former    ALCOHOL: former    Drug: former heroin and cocaine     REVIEW OF SYSTEMS:  CONSTITUTIONAL: No fever, weight loss, or fatigue  EYES: No eye pain, visual disturbances, or discharge  ENMT:  No difficulty hearing, tinnitus, vertigo; No sinus or throat pain  NECK: No pain or stiffness  RESPIRATORY: as per HPI  CARDIOVASCULAR: as per HPI  GASTROINTESTINAL: No abdominal or epigastric pain. No nausea, vomiting, or hematemesis; No diarrhea or constipation. No melena or hematochezia.  GENITOURINARY: No dysuria, frequency, hematuria, or incontinence  NEUROLOGICAL: No headaches, memory loss, loss of strength, numbness, or tremors  SKIN: No itching, burning, rashes, or lesions   LYMPH Nodes: No enlarged glands  ENDOCRINE: No heat or cold intolerance; No hair loss  MUSCULOSKELETAL: No joint pain or swelling; No muscle, back, or extremity pain  PSYCHIATRIC: No depression, anxiety, mood swings, or difficulty sleeping  HEME/LYMPH: No easy bruising, or bleeding gums  ALLERGY AND IMMUNOLOGIC: No hives or eczema	    Vital Signs Last 24 Hrs  T(C): 36.9 (28 Aug 2019 10:55), Max: 36.9 (28 Aug 2019 10:55)  T(F): 98.4 (28 Aug 2019 10:55), Max: 98.4 (28 Aug 2019 10:55)  HR: 53 (28 Aug 2019 11:16) (39 - 53)  BP: 96/68 (28 Aug 2019 11:16) (93/46 - 96/68)  BP(mean): --  RR: 18 (28 Aug 2019 11:16) (18 - 18)  SpO2: 100% (28 Aug 2019 11:16) (96% - 100%)    Daily Height in cm: 182.88 (28 Aug 2019 10:55)    Daily     I&O's Detail      PHYSICAL EXAM:  Appearance: Normal, well nourished	  HEENT:  Dry oral mucosa, PERRL, EOMI, sclera non-icteric	  Lymphatic: No cervical lymphadenopathy  Cardiovascular: Normal S1 S2, No JVD, No cardiac murmurs, No carotid bruits. BLE 2+ pitting edema  Respiratory: Lungs clear to auscultation	  Psychiatry: A & O x 3, Mood & affect appropriate  Gastrointestinal:  Soft, Non-tender, + BS, no bruits	  Skin: No rashes, No ecchymoses, No cyanosis  Neurologic: Grossly non-focal with full strength in all four extremities  Extremities: Normal range of motion, No clubbing, cyanosis. BLE 2+ pitting edema  Vascular: Peripheral pulses palpable 2+ bilaterally      INTERPRETATION OF TELEMETRY: AFib SVR HR 40s-50s    ECG: AFib SVR 48bpm, unchanged from 4/8/19    LABS:                        9.2    6.13  )-----------( 226      ( 28 Aug 2019 10:51 )             28.7               PT/INR - ( 28 Aug 2019 10:51 )   PT: 16.2 sec;   INR: 1.39 ratio         PTT - ( 28 Aug 2019 10:51 )  PTT:33.4 sec    I&O's Summary    BNP Patient is a 74y old  Male who presents with a chief complaint of syncope, bradycardia.    HPI:  75yo male with PMH Afib on Eliquis, HTN, HLD, CKD, CHF (unknown EF), chronic BLE edema, multiple myeloma brought to ED s/p syncopal episode.  Patient unaware of events, states family found him on floor and he woke up with EMS, AMS, bradycardic HR in 30s, EMS administered Atropine 0.5mg, ED gave 2nd dose on arrival d/t HR 30-40s hypotensive 90s/40s.  Patient A&Ox3, states he is always weak but has had increased weakness and SOB x3days. Follows closely with cardio Dr. Damon at [facility], PMD Dr. Trejo.  He states he is very good at organizing his meds in a pill box, and does not think he took extra Toprol, but feels he could use some help.  Recent decrease in PO intake, appears extremely dry. Denies chest pain/pressure, palpitations, irregular and/or rapid heart beat, dizziness, orthopnea, PND, cough, n/v/d, hematuria, or hematochezia.     PAST MEDICAL & SURGICAL HISTORY:  Diabetes type 2, controlled  Afib  HTN (hypertension)  Multiple myeloma  CKD (chronic kidney disease)  No significant past surgical history    PREVIOUS DIAGNOSTIC TESTING:  none documented    Allergies  lisinopril (Angioedema)  Intolerances        MEDICATIONS  (STANDING):    MEDICATIONS  (PRN):    Home Medications:  Breo Ellipta:  (28 Aug 2019 11:10)  Crestor: 20 (28 Aug 2019 11:12)  Eliquis 5 mg oral tablet: BID (28 Aug 2019 11:09)  furosemide 40 mg oral tablet: 2 tablets BID (28 Aug 2019 11:11)  Januvia:  (28 Aug 2019 11:12)  losartan:  100mg QD (28 Aug 2019 11:13)  metoprolol succ: 50mg QD (28 Aug 2019 11:11)  Neurontin 300 mg oral capsule: TID  (28 Aug 2019 11:10)  omeprazole:  40mg QD (28 Aug 2019 11:12)  oxyCODONE 10 mg oral tablet, extended release: 1 tab(s) orally QID (28 Aug 2019 11:10)  Nifedipine ER 90mg QD  KCL 10mEq 2tab BID  Acyclovir 400mg TID    FAMILY HISTORY:  strong family Hx of Heart disease    SOCIAL HISTORY:    CIGARETTES: former    ALCOHOL: former    Drug: former heroin and cocaine     REVIEW OF SYSTEMS:  CONSTITUTIONAL: No fever, weight loss, or fatigue  EYES: No eye pain, visual disturbances, or discharge  ENMT:  No difficulty hearing, tinnitus, vertigo; No sinus or throat pain  NECK: No pain or stiffness  RESPIRATORY: as per HPI  CARDIOVASCULAR: as per HPI  GASTROINTESTINAL: No abdominal or epigastric pain. No nausea, vomiting, or hematemesis; No diarrhea or constipation. No melena or hematochezia.  GENITOURINARY: No dysuria, frequency, hematuria, or incontinence  NEUROLOGICAL: No headaches, memory loss, loss of strength, numbness, or tremors  SKIN: No itching, burning, rashes, or lesions   LYMPH Nodes: No enlarged glands  ENDOCRINE: No heat or cold intolerance; No hair loss  MUSCULOSKELETAL: No joint pain or swelling; No muscle, back, or extremity pain  PSYCHIATRIC: No depression, anxiety, mood swings, or difficulty sleeping  HEME/LYMPH: No easy bruising, or bleeding gums  ALLERGY AND IMMUNOLOGIC: No hives or eczema	    Vital Signs Last 24 Hrs  T(C): 36.9 (28 Aug 2019 10:55), Max: 36.9 (28 Aug 2019 10:55)  T(F): 98.4 (28 Aug 2019 10:55), Max: 98.4 (28 Aug 2019 10:55)  HR: 53 (28 Aug 2019 11:16) (39 - 53)  BP: 96/68 (28 Aug 2019 11:16) (93/46 - 96/68)  BP(mean): --  RR: 18 (28 Aug 2019 11:16) (18 - 18)  SpO2: 100% (28 Aug 2019 11:16) (96% - 100%)    Daily Height in cm: 182.88 (28 Aug 2019 10:55)    Daily     I&O's Detail      PHYSICAL EXAM:  Appearance: Normal, well nourished	  HEENT:  Dry oral mucosa, PERRL, EOMI, sclera non-icteric	  Lymphatic: No cervical lymphadenopathy  Cardiovascular: Normal S1 S2, No JVD, No cardiac murmurs, No carotid bruits. BLE 2+ pitting edema  Respiratory: Lungs clear to auscultation	  Psychiatry: A & O x 3, Mood & affect appropriate  Gastrointestinal:  Soft, Non-tender, + BS, no bruits	  Skin: No rashes, No ecchymoses, No cyanosis  Neurologic: Grossly non-focal with full strength in all four extremities  Extremities: Normal range of motion, No clubbing, cyanosis. BLE 2+ pitting edema  Vascular: Peripheral pulses palpable 2+ bilaterally      INTERPRETATION OF TELEMETRY: AFib SVR HR 40s-50s    ECG: AFib Ventricular Rates  48bpm, unchanged from 4/8/19    LABS:                        9.2    6.13  )-----------( 226      ( 28 Aug 2019 10:51 )             28.7     PT/INR - ( 28 Aug 2019 10:51 )   PT: 16.2 sec;   INR: 1.39 ratio    PTT - ( 28 Aug 2019 10:51 )  PTT:33.4 sec

## 2019-08-28 NOTE — H&P ADULT - NSICDXPASTMEDICALHX_GEN_ALL_CORE_FT
PAST MEDICAL HISTORY:  Afib     CKD (chronic kidney disease)     Diabetes type 2, controlled     HTN (hypertension)     Multiple myeloma

## 2019-08-28 NOTE — ED PROVIDER NOTE - PHYSICAL EXAMINATION
Gen: Well appearing male, tired and slow to answer but in NAD  Head: NC/AT  Neck: trachea midline  Resp:  No distress, CTAB  CV: irregular, bradycardic  GI: soft, NTND  Ext: no deformities, no calf ttp. No edema.   Neuro:  A&O appears non focal  Skin:  Warm and dry as visualized  Psych:  Normal affect and mood

## 2019-08-28 NOTE — ED ADULT NURSE NOTE - ED STAT RN HANDOFF DETAILS
Patient transported to unit with 2 RN's due to no transport/delay in transport. Patient A&Ox4, denies any pain or discomfort. Denies any chest pain, shortness of breath, nausea or dizziness. Cardiac monitor in place. Saline lock in place, patent, negative s/s phlebitis or infiltration. Report given to UMM Ott at bedside. Made aware of pacer pads in place. Patient placed in bed by this RN, placed in position of comfort.

## 2019-08-28 NOTE — CONSULT NOTE ADULT - ASSESSMENT
75 y/o male pmhx DM2, afib on Eliquis , HTN, multiple myeloma s/p stem cell transplant, CKD, sickle cell trait who presented today after being found down at home. Patient admitted w/ symptomatic bradycardia. At this time patient has 's, and HR sinus rosie 50's w/ out any transcutaneous pacing, 73 y/o male pmhx DM2, afib on Eliquis , HTN, multiple myeloma s/p stem cell transplant, CKD, sickle cell trait who presented today after being found down at home. Patient admitted w/ symptomatic bradycardia. At this time patient has 's, and HR sinus rosie 50's w/ out any transcutaneous pacing, he does not require an ICU admission. Please re consult as needed. Case discussed w/ ICU Dr. Marsh.     Plan:  -Symptomatic Bradycardia, unclear etiology possibly due to accidentally taking to many BB.  Has normal mentation, good perfusion.   Telemetry  Monitoring. Keep Zoll pads attached. Atropine at beside. Hold any AV tiffanie blockers.  EP following, possible PPM   -HTN, Normotensive now, hold antihypertensives.   -Afib, started on full dose Lovenox as per EP

## 2019-08-28 NOTE — H&P ADULT - NSHPSOCIALHISTORY_GEN_ALL_CORE
ex smoker, no etoh, ex cocaine & marijuana user    Family hx of brain ca in daughter & prostate ca in brother

## 2019-08-28 NOTE — CONSULT NOTE ADULT - ATTENDING COMMENTS
Patient seen and examined.  A&P amended above.  Newly diagnosed paroxysmal atrial fibrillation (6/2019) presents with symptomatic bradycardia.  TTE pending.  Hold metoprolol and nifedipine. Prior ischemic workup in [facility]  2 months ago - negative for ongoing ischemia.  EP following - will defer management to Dr. Fu.  Will sign off.  Please call with any questions.

## 2019-08-28 NOTE — ED PROVIDER NOTE - OBJECTIVE STATEMENT
74M h/o MM, HTN, afib on eliquis/metoprolol, DM p/w worsened weakness x 3 days and fall/syncope today. Pt is unsure what happened this morning, remembers waking up tired, took his meds and woke up on the floor with a roommate waking him. His HR with ES was in the 30s and irregular. Pt was altered with EMS. Received 0.5mg atropine and some IVF and HR improved to 60s, mental status improved. Pt currently with HR ranging from 35-55. Denies CP, fever, cough, SOB, pain, palpitations, nausea, vomiting.

## 2019-08-28 NOTE — CONSULT NOTE ADULT - ASSESSMENT
In summary, this is a 74 year old male with DM 2, paroxysmal AF (on Apixaban), hypertension, CKD (Cr 1.63-2.65), hyperparathyroidism, multiple myeloma s/p stem cell transplant/XRT, sickle cell trait, prostate cancer status post surgery who presented with syncope and AF with slow ventricular response for which EP is consulted. The patient appears to have symptomatic bradycardia. His renal function is within the range he stays in based on the past several months' lab work. He does appear to be quite dry today on physical exam, though he states he has good appetite. We will hold his beta blocker and see how his HR improves. If his HR normalizes (ie >60 bpm without symptoms) after holding BB, then we can avoid pacemaker implantation. If he remains in slow AF, despite holding BB, then he would benefit with pacemaker implantation. Ideally, a leadless pacemaker seems best considering his multiple comorbidities.    His CHADS2-VASc is 3 (Agex1, DM, HTN) and so he should continue to be anticoagulated. Since he may require a procedure, recommend discontinuing eliquis and using therapeutic enoxaparin instead.    Recommendations:  - Hold beta blockers  - Hold eliquis, change to renally adjusted therapeutic enoxaparin  - Monitor on telemetry  - Possible pacemaker if patient does not have improvement in HR    Thank you for this consult. We will follow with you.    Campos Fu MD  Clinical Cardiac Electrophysiology

## 2019-08-28 NOTE — ED PROVIDER NOTE - CLINICAL SUMMARY MEDICAL DECISION MAKING FREE TEXT BOX
Pt with symptomatic bradycardia, in afib, has not taken extra medications. Also with syncope on eliquis.   - labs, pacer pads, [place] cards, cxr, CTH/CT c spine, IVF, reassess. Admit, likely needs PPM

## 2019-08-28 NOTE — ED ADULT NURSE REASSESSMENT NOTE - NS ED NURSE REASSESS COMMENT FT1
Patient awake alert, and oriented times 3, breathing unlabored.  No complaints of pain at this time, cardiac monitor continued.  Cardiologist at bedside evaluating patient.  IV fluids infusing without difficulty

## 2019-08-28 NOTE — ED ADULT NURSE NOTE - PMH
Afib    CKD (chronic kidney disease)    Diabetes type 2, controlled    HTN (hypertension)    Multiple myeloma

## 2019-08-28 NOTE — ED PROVIDER NOTE - PROGRESS NOTE DETAILS
Hospitalist, Pal paged: Pt's family is here, would like to speak with you when you get the chance, thanks.   Tong GHOSH: No improvement after atropine. [facility] and EP consulted. Tong GHOSH: pt seen by MICU, does not require ICU level care at this point. Accepted by hospitalist to stepdown.

## 2019-08-28 NOTE — H&P ADULT - ASSESSMENT
74 year old male with DM 2, paroxysmal AF (on Apixaban), hypertension, CKD (Cr 1.63-2.65), hyperparathyroidism, multiple myeloma s/p stem cell transplant/XRT, sickle cell trait, prostate cancer status post surgery who presented with syncope and bradycardia. The patient states he woke up this morning and was sitting at the edge of the bed. He took his medications and the next thing he remembers is waking up on the floor with EMS assessing him. He denies having any chest pain, palpitations, dizziness, lightheadedness, dyspnea, fevers, chills, cough, nausea, vomiting, diarrhea, dysuria, hematuria, hematochezia, or melena. He does note that over the past three days he has had increased fatigue and tiredness. He manages his medications on his own and has them in a pill box. He does not feel he has taken any more medications than he is prescribed. At his house, ECG by EMS reveals atrial flutter with slow ventricular response with HR to the 30-40s. He was given IV Atropine 0.5mg with minimal effect on his rate. Upon arrival to the ER, he was noted to be in AF with slow ventricular response and was given an additional IV Atropine 0.5mg x1 with minimal improvement in HR to the 50s. Currently pt's BP is 119/50 & HR is 63. Currently denying any symptoms. Pt is on Nifedipine & metoprolol at home & denies any med changes recently. Admits taking extra tizanidine & oxycodone prior to the event due to body ache.         Syncope likely from combination of BB, CCB, opiates & Tizanidine-  Admit to tele  Currently pt's BP is 119/50 & HR is 63.  - Tele monitoring overnight  - EKG essentially unchanged from prior  - AFib w/  bradycardia 50-60's -- no need for pacing at this time.   - Hold Nifedipine & metoprolol   - TTE  Cardio & EPS appreciated    hx of HTN  -  hypotensive on admission, now normalized, hold all BP meds for now. Resume prn    HLD  - c/w statin    DM 2-  A1c  c/w home meds    Paroxysmal AF (on Apixaban)-  Hold BB & eliquis  Full dose lovenox incase pt needs PPM in am    CKD stage 3  Baseline Cr 1.8, now 2.26  Avoid nephrotoxic agents  Hold lasix & losartan tonight  f/u BMP    DVT ppx: on lovenox    Pt does not know what meds he take & the dosages. Friend to bring them tonight 74 year old male with DM 2, paroxysmal AF (on Apixaban), hypertension, CKD (Cr 1.63-2.65), hyperparathyroidism, multiple myeloma s/p stem cell transplant/XRT, sickle cell trait, prostate cancer status post surgery who presented with syncope and bradycardia. The patient states he woke up this morning and was sitting at the edge of the bed. He took his medications and the next thing he remembers is waking up on the floor with EMS assessing him. He denies having any chest pain, palpitations, dizziness, lightheadedness, dyspnea, fevers, chills, cough, nausea, vomiting, diarrhea, dysuria, hematuria, hematochezia, or melena. He does note that over the past three days he has had increased fatigue and tiredness. He manages his medications on his own and has them in a pill box. He does not feel he has taken any more medications than he is prescribed. At his house, ECG by EMS reveals atrial flutter with slow ventricular response with HR to the 30-40s. He was given IV Atropine 0.5mg with minimal effect on his rate. Upon arrival to the ER, he was noted to be in AF with slow ventricular response and was given an additional IV Atropine 0.5mg x1 with minimal improvement in HR to the 50s. Currently pt's BP is 119/50 & HR is 63. Currently denying any symptoms. Pt is on Nifedipine & metoprolol at home & denies any med changes recently. Admits taking extra tizanidine & oxycodone prior to the event due to body ache.         Syncope likely from combination of BB, CCB, opiates & Tizanidine-  Admit to tele  Currently pt's BP is 119/50 & HR is 63.  - Tele monitoring overnight  - EKG essentially unchanged from prior  - AFib w/  bradycardia 50-60's -- no need for pacing at this time.   - Hold Nifedipine 90 qD & metoprolol 25 qD  -Hold opiates & Tizanidine  - TTE  Cardio & EPS appreciated    hx of HTN  -  hypotensive on admission, now normalized, hold all BP meds for now. Resume prn    HLD  - c/w statin    DM 2-  A1c  ISS  c/w home meds sitagliptin 50 on d/c    Paroxysmal AF (on Apixaban)-  Hold BB & eliquis  Full dose lovenox incase pt needs PPM in am    CKD stage 3  Baseline Cr 1.8, now 2.26  Avoid nephrotoxic agents  Hold lasix 40 & losartan 100 tonight  f/u BMP    DVT ppx: on lovenox 74 year old male with DM 2, paroxysmal AF (on Apixaban), hypertension, CKD (Cr 1.63-2.65), hyperparathyroidism, multiple myeloma s/p stem cell transplant/XRT, sickle cell trait, prostate cancer status post surgery who presented with syncope and bradycardia. The patient states he woke up this morning and was sitting at the edge of the bed. He took his medications and the next thing he remembers is waking up on the floor with EMS assessing him. He denies having any chest pain, palpitations, dizziness, lightheadedness, dyspnea, fevers, chills, cough, nausea, vomiting, diarrhea, dysuria, hematuria, hematochezia, or melena. He does note that over the past three days he has had increased fatigue and tiredness. He manages his medications on his own and has them in a pill box. He does not feel he has taken any more medications than he is prescribed. At his house, ECG by EMS reveals atrial flutter with slow ventricular response with HR to the 30-40s. He was given IV Atropine 0.5mg with minimal effect on his rate. Upon arrival to the ER, he was noted to be in AF with slow ventricular response and was given an additional IV Atropine 0.5mg x1 with minimal improvement in HR to the 50s. Currently pt's BP is 119/50 & HR is 63. Currently denying any symptoms. Pt is on Nifedipine & metoprolol at home & denies any med changes recently. Admits taking extra tizanidine & oxycodone prior to the event due to body ache.         Syncope likely from combination of BB, CCB, opiates & Tizanidine-  Admit to tele  Currently pt's BP is 119/50 & HR is 63.  - Tele monitoring overnight  - EKG essentially unchanged from prior  - AFib w/  bradycardia 50-60's -- no need for pacing at this time.   - Hold Nifedipine 90 qD & metoprolol 25 qD  -Hold opiates & Tizanidine  - TTE  Cardio & EPS appreciated    hx of HTN  -  hypotensive on admission, now normalized, hold all BP meds for now. Resume prn    HLD  - c/w statin    DM 2-  A1c  ISS  c/w home meds sitagliptin 50 on d/c    Paroxysmal AF (on Apixaban)-  Hold BB & eliquis  Full dose lovenox incase pt needs PPM in am    LUIZ on CKD stage 3  Baseline Cr 1.8, now 2.26  Avoid nephrotoxic agents  Hold lasix 40 & losartan 100 tonight  f/u BMP    DVT ppx: on lovenox

## 2019-08-28 NOTE — PHARMACOTHERAPY INTERVENTION NOTE - COMMENTS
Patient with CrCl of 38, Scr 2.26 and downtrending, started on renally dosed lovenox 120 mG qday for full anticoagulation. Spoke with overnight PA to recommend 120 mG BID dosing due to CrCl > 30.

## 2019-08-28 NOTE — ED ADULT NURSE REASSESSMENT NOTE - NS ED NURSE REASSESS COMMENT FT1
Patient A&Ox4 complaining of being hungry. MD aware, patient OK to eat. Remains on pacer pads attached to monitor. Hard monitor in place, bradycardic. Saline lock in place, patent, negative s/s phlebitis or infiltration. Plan of care discussed, all questions answered. Will continue to monitor.

## 2019-08-28 NOTE — ED ADULT NURSE NOTE - OBJECTIVE STATEMENT
Patient found laying in stretcher, awake alert, and oriented times 3, breathing unlabored.  Patient has no complaints at this time.  Patient complaining of intermittent weakness and " being tired"  for 1 year however has got worse the past 3 days.  Patient was found on floor by friend this morning.  patient does not remember incident.  unknown if hit head.  carpet floor. Patient found laying in stretcher, awake alert, and oriented times 3, breathing unlabored.  Patient has no complaints at this time.  Patient complaining of intermittent weakness and " being tired"  for 1 year however has got worse the past 3 days.  Patient was found on floor by friend this morning.  patient does not remember incident.  unknown if hit head.  carpet floor.  Bilateral lower extremity edema noted which is chronic as per patient.

## 2019-08-28 NOTE — ED ADULT NURSE NOTE - NSIMPLEMENTINTERV_GEN_ALL_ED
Implemented All Fall with Harm Risk Interventions:  [place] to call system. Call bell, personal items and telephone within reach. Instruct patient to call for assistance. Room bathroom lighting operational. Non-slip footwear when patient is off stretcher. Physically safe environment: no spills, clutter or unnecessary equipment. Stretcher in lowest position, wheels locked, appropriate side rails in place. Provide visual cue, wrist band, yellow gown, etc. Monitor gait and stability. Monitor for mental status changes and reorient to person, place, and time. Review medications for side effects contributing to fall risk. Reinforce activity limits and safety measures with patient and family. Provide visual clues: red socks.

## 2019-08-28 NOTE — ED ADULT NURSE REASSESSMENT NOTE - NS ED NURSE REASSESS COMMENT FT1
Pacer pads placed on patients chest, code cart at beside. Cardiac monitor in place, remains bradycardic at 44-56. Will continue to monitor.

## 2019-08-28 NOTE — CHART NOTE - NSCHARTNOTEFT_GEN_A_CORE
PA NOTR-MED     Called by Emergency Dept Pharmacist suggesting Pts Lovenox dose should be 120mg BID instead of 120 mg Daily  Pt is 73 yo Male admitted for syncope with bradycardia with h/o paroxysmal Afib and CKD  who was on Eliquis 5 mg po daily at home    In MD's notes it states Pt should be fully dosed with lovenox   Pts last lab results: GFR- 32 Creat-2.26  Changed Lovenox order from 120 mg Daily to Lovenox 120 mg BID  Discussed case with Dr Price-Hospitalist and she agrees with plan

## 2019-08-28 NOTE — CONSULT NOTE ADULT - SUBJECTIVE AND OBJECTIVE BOX
Patient is a 74y old  Male who presents with a chief complaint of Syncope (28 Aug 2019 11:59)      75 y/o male pmhx DM2, afib on Eliquis , HTN, multiple myeloma s/p stem cell transplant, CKD, sickle cell trait who presented today after being found down at home. As per patient he said he woke up this morning w/ some back pain and took a oxycodone and zantac. The next thing he knew EMS was waking him up. EMS gave him 0.5mg Atropine for HR 30's, received additional 0.5mg Atropine in ED. He states he did not mix up his medications/ take any additional metoprolol.     Patient was seen and examined in ED, sitting in bed w/ no complaints. Transcutaneous pads attached, not requiring any passing at this time. /71.     PAST MEDICAL & SURGICAL HISTORY:  Diabetes type 2, controlled  Afib  HTN (hypertension)  Multiple myeloma  CKD (chronic kidney disease)  No significant past surgical history    Allergies    lisinopril (Angioedema)    Intolerances      FAMILY HISTORY:      Review of Systems:  CONSTITUTIONAL: No fever, chills, or fatigue  EYES: No eye pain, visual disturbances, or discharge  ENMT:  No difficulty hearing, tinnitus, vertigo; No sinus or throat pain  NECK: No pain or stiffness  RESPIRATORY: No cough, wheezing, chills or hemoptysis; No shortness of breath  CARDIOVASCULAR: No chest pain, palpitations, dizziness, or leg swelling  GASTROINTESTINAL: No abdominal or epigastric pain. No nausea, vomiting, or hematemesis; No diarrhea or constipation. No melena or hematochezia.  GENITOURINARY: No dysuria, frequency, hematuria, or incontinence  NEUROLOGICAL: No headaches, memory loss, loss of strength, numbness, or tremors  SKIN: No itching, burning, rashes, or lesions   MUSCULOSKELETAL: No joint pain or swelling; No muscle, back, or extremity pain  PSYCHIATRIC: No depression, anxiety, mood swings, or difficulty sleeping      Medications:              ICU Vital Signs Last 24 Hrs  T(C): 36.9 (28 Aug 2019 10:55), Max: 36.9 (28 Aug 2019 10:55)  T(F): 98.4 (28 Aug 2019 10:55), Max: 98.4 (28 Aug 2019 10:55)  HR: 53 (28 Aug 2019 11:16) (39 - 53)  BP: 96/68 (28 Aug 2019 11:16) (93/46 - 96/68)  RR: 18 (28 Aug 2019 11:16) (18 - 18)  SpO2: 100% (28 Aug 2019 11:16) (96% - 100%)    Vital Signs Last 24 Hrs  T(C): 36.9 (28 Aug 2019 10:55), Max: 36.9 (28 Aug 2019 10:55)  T(F): 98.4 (28 Aug 2019 10:55), Max: 98.4 (28 Aug 2019 10:55)  HR: 53 (28 Aug 2019 11:16) (39 - 53)  BP: 96/68 (28 Aug 2019 11:16) (93/46 - 96/68)  RR: 18 (28 Aug 2019 11:16) (18 - 18)  SpO2: 100% (28 Aug 2019 11:16) (96% - 100%)        I&O's Detail        LABS:                        9.2    6.13  )-----------( 226      ( 28 Aug 2019 10:51 )             28.7     08-28    138  |  106  |  37.0<H>  ----------------------------<  138<H>  5.1   |  21.0<L>  |  2.26<H>    Ca    7.8<L>      28 Aug 2019 12:27    TPro  6.0<L>  /  Alb  3.3  /  TBili  0.2<L>  /  DBili  x   /  AST  20  /  ALT  19  /  AlkPhos  54  08-28      CARDIAC MARKERS ( 28 Aug 2019 12:27 )  x     / 0.03 ng/mL / x     / x     / x      CARDIAC MARKERS ( 28 Aug 2019 10:51 )  x     / 0.03 ng/mL / x     / x     / x          CAPILLARY BLOOD GLUCOSE        PT/INR - ( 28 Aug 2019 10:51 )   PT: 16.2 sec;   INR: 1.39 ratio         PTT - ( 28 Aug 2019 10:51 )  PTT:33.4 sec    CULTURES:      Physical Examination:    General:  AAOX3. No acute distress     HEENT: Pupils equal, reactive to light.  Symmetric.    PULM: Clear to auscultation bilaterally, no significant sputum production. No wheeze/rales/rhonchi     CVS: + S1/S2. Sinus Bradycardia. No Murmurs.     ABD: Soft, nondistended, nontender, normoactive bowel sounds, no masses    EXT: No edema, nontender    SKIN: Warm and well perfused, no rashes noted.    NEURO: A&Ox3, strength 5/5 all extremities,  no focal deficits    RADIOLOGY: < from: Xray Chest 1 View- PORTABLE-Urgent (08.28.19 @ 11:54) >   EXAM:  XR CHEST PORTABLE URGENT 1V                          PROCEDURE DATE:  08/28/2019          INTERPRETATION:  XR CHEST URGENT    Single AP view    HISTORY:  bradycardia    Comparison: Chest x-ray 1/8/2014      The cardiac silhouette is within normal limits. The lungs are clear. No   pleural abnormality.    IMPRESSION: No acute disease.    < end of copied text >      TIME SPENT:  38 min   Evaluating/treating patient, reviewing data/labs/imaging, discussing case with multidisciplinary team, discussing plan/goals of care with patient/family. Non-inclusive of procedure time.

## 2019-08-28 NOTE — CONSULT NOTE ADULT - ASSESSMENT
A/P:  73yo male with PMH Afib on Eliquis, HTN, HLD, CKD, CHF (unknown EF), chronic BLE edema, multiple myeloma brought to ED s/p syncopal episode.  Patient unaware of events, states family found him on floor and he woke up with EMS, AMS, bradycardic HR in 30s, EMS administered Atropine 0.5mg, ED gave 2nd dose on arrival d/t HR 30-40s hypotensive 90s/40s.  Patient A&Ox3, states he is always weak but has had increased weakness and SOB x3days. Follows closely with cardio Dr. Damon at [facility], PMD Dr. Trejo.  He states he is very good at organizing his meds in a pill box, and does not think he took extra Toprol, but feels he could use some help.  Recent decrease in PO intake, appears extremely dry.     1. Syncope  - Tele monitoring  - EKG essentially unchanged from prior  - AFib w/SVR, bradycardia 40-50s, maintain pacing pads in place  - Hold AV nodals  - TTE  - Labs pending  - potential need for PPM, EP following, re-eval after Toprol wears off    2. HTN  - currently hypotensive, if becomes elevated resume home meds w/exception of BB    3. HLD  - c/w statin A/P:  75yo male with PMH Afib on Eliquis, HTN, HLD, CKD, CHF (unknown EF), chronic BLE edema, multiple myeloma brought to ED s/p syncopal episode.  Patient unaware of events, states family found him on floor and he woke up with EMS, AMS, bradycardic HR in 30s, EMS administered Atropine 0.5mg, ED gave 2nd dose on arrival d/t HR 30-40s hypotensive 90s/40s.  Patient A&Ox3, states he is always weak but has had increased weakness and SOB x3days. Follows closely with cardio Dr. Damon at [facility], PMD Dr. Trejo.  He states he is very good at organizing his meds in a pill box, and does not think he took extra Toprol, but feels he could use some help.  Recent decrease in PO intake, appears extremely dry.     1. Syncope  - Tele monitoring  - EKG essentially unchanged from prior  - AFib w/  bradycardia 40-50s - currently well perfused - normal UOP, BP stable, mentation normal - no need for pacing at this time.   - Hold any AV tiffanie blocking agents  - TTE  - Labs pending  - potential need for PPM, EP following, re-eval after Toprol wears off    2. hx of HTN  - currently hypotensive, if becomes elevated resume home meds w/exception of BB    3. HLD  - c/w statin

## 2019-08-29 ENCOUNTER — TRANSCRIPTION ENCOUNTER (OUTPATIENT)
Age: 74
End: 2019-08-29

## 2019-08-29 VITALS — DIASTOLIC BLOOD PRESSURE: 78 MMHG | SYSTOLIC BLOOD PRESSURE: 130 MMHG

## 2019-08-29 LAB
ANION GAP SERPL CALC-SCNC: 11 MMOL/L — SIGNIFICANT CHANGE UP (ref 5–17)
BASOPHILS # BLD AUTO: 0.02 K/UL — SIGNIFICANT CHANGE UP (ref 0–0.2)
BASOPHILS NFR BLD AUTO: 0.4 % — SIGNIFICANT CHANGE UP (ref 0–2)
BUN SERPL-MCNC: 33 MG/DL — HIGH (ref 8–20)
CALCIUM SERPL-MCNC: 8.3 MG/DL — LOW (ref 8.6–10.2)
CHLORIDE SERPL-SCNC: 109 MMOL/L — HIGH (ref 98–107)
CO2 SERPL-SCNC: 19 MMOL/L — LOW (ref 22–29)
CREAT SERPL-MCNC: 2.04 MG/DL — HIGH (ref 0.5–1.3)
EOSINOPHIL # BLD AUTO: 0.09 K/UL — SIGNIFICANT CHANGE UP (ref 0–0.5)
EOSINOPHIL NFR BLD AUTO: 1.8 % — SIGNIFICANT CHANGE UP (ref 0–6)
GLUCOSE BLDC GLUCOMTR-MCNC: 125 MG/DL — HIGH (ref 70–99)
GLUCOSE BLDC GLUCOMTR-MCNC: 125 MG/DL — HIGH (ref 70–99)
GLUCOSE SERPL-MCNC: 107 MG/DL — SIGNIFICANT CHANGE UP (ref 70–115)
HBA1C BLD-MCNC: 6.5 % — HIGH (ref 4–5.6)
HCT VFR BLD CALC: 28.4 % — LOW (ref 39–50)
HCV AB S/CO SERPL IA: 0.53 S/CO — SIGNIFICANT CHANGE UP (ref 0–0.99)
HCV AB SERPL-IMP: SIGNIFICANT CHANGE UP
HGB BLD-MCNC: 9.2 G/DL — LOW (ref 13–17)
IMM GRANULOCYTES NFR BLD AUTO: 0.4 % — SIGNIFICANT CHANGE UP (ref 0–1.5)
LYMPHOCYTES # BLD AUTO: 0.86 K/UL — LOW (ref 1–3.3)
LYMPHOCYTES # BLD AUTO: 17 % — SIGNIFICANT CHANGE UP (ref 13–44)
MAGNESIUM SERPL-MCNC: 2 MG/DL — SIGNIFICANT CHANGE UP (ref 1.6–2.6)
MCHC RBC-ENTMCNC: 29.2 PG — SIGNIFICANT CHANGE UP (ref 27–34)
MCHC RBC-ENTMCNC: 32.4 GM/DL — SIGNIFICANT CHANGE UP (ref 32–36)
MCV RBC AUTO: 90.2 FL — SIGNIFICANT CHANGE UP (ref 80–100)
MONOCYTES # BLD AUTO: 0.57 K/UL — SIGNIFICANT CHANGE UP (ref 0–0.9)
MONOCYTES NFR BLD AUTO: 11.3 % — SIGNIFICANT CHANGE UP (ref 2–14)
NEUTROPHILS # BLD AUTO: 3.49 K/UL — SIGNIFICANT CHANGE UP (ref 1.8–7.4)
NEUTROPHILS NFR BLD AUTO: 69.1 % — SIGNIFICANT CHANGE UP (ref 43–77)
PHOSPHATE SERPL-MCNC: 2.8 MG/DL — SIGNIFICANT CHANGE UP (ref 2.4–4.7)
PLATELET # BLD AUTO: 240 K/UL — SIGNIFICANT CHANGE UP (ref 150–400)
POTASSIUM SERPL-MCNC: 4.3 MMOL/L — SIGNIFICANT CHANGE UP (ref 3.5–5.3)
POTASSIUM SERPL-SCNC: 4.3 MMOL/L — SIGNIFICANT CHANGE UP (ref 3.5–5.3)
RBC # BLD: 3.15 M/UL — LOW (ref 4.2–5.8)
RBC # FLD: 16.8 % — HIGH (ref 10.3–14.5)
SODIUM SERPL-SCNC: 139 MMOL/L — SIGNIFICANT CHANGE UP (ref 135–145)
TSH SERPL-MCNC: 0.54 UIU/ML — SIGNIFICANT CHANGE UP (ref 0.27–4.2)
WBC # BLD: 5.05 K/UL — SIGNIFICANT CHANGE UP (ref 3.8–10.5)
WBC # FLD AUTO: 5.05 K/UL — SIGNIFICANT CHANGE UP (ref 3.8–10.5)

## 2019-08-29 PROCEDURE — 99232 SBSQ HOSP IP/OBS MODERATE 35: CPT

## 2019-08-29 PROCEDURE — 99239 HOSP IP/OBS DSCHRG MGMT >30: CPT

## 2019-08-29 RX ORDER — TIZANIDINE 4 MG/1
4 TABLET ORAL
Qty: 0 | Refills: 0 | DISCHARGE

## 2019-08-29 RX ORDER — LOSARTAN POTASSIUM 100 MG/1
1 TABLET, FILM COATED ORAL
Qty: 0 | Refills: 0 | DISCHARGE

## 2019-08-29 RX ORDER — NIFEDIPINE 30 MG
1 TABLET, EXTENDED RELEASE 24 HR ORAL
Qty: 0 | Refills: 0 | DISCHARGE

## 2019-08-29 RX ORDER — OMEPRAZOLE 10 MG/1
0 CAPSULE, DELAYED RELEASE ORAL
Qty: 0 | Refills: 0 | DISCHARGE

## 2019-08-29 RX ORDER — POTASSIUM CHLORIDE 20 MEQ
1 PACKET (EA) ORAL
Qty: 0 | Refills: 0 | DISCHARGE

## 2019-08-29 RX ORDER — METOPROLOL TARTRATE 50 MG
1 TABLET ORAL
Qty: 0 | Refills: 0 | DISCHARGE

## 2019-08-29 RX ORDER — ROSUVASTATIN CALCIUM 5 MG/1
20 TABLET ORAL
Qty: 0 | Refills: 0 | DISCHARGE

## 2019-08-29 RX ORDER — GABAPENTIN 400 MG/1
0 CAPSULE ORAL
Qty: 0 | Refills: 0 | DISCHARGE

## 2019-08-29 RX ORDER — APIXABAN 2.5 MG/1
5 TABLET, FILM COATED ORAL EVERY 12 HOURS
Refills: 0 | Status: DISCONTINUED | OUTPATIENT
Start: 2019-08-29 | End: 2019-08-29

## 2019-08-29 RX ORDER — OXYCODONE HYDROCHLORIDE 5 MG/1
10 TABLET ORAL
Qty: 0 | Refills: 0 | DISCHARGE

## 2019-08-29 RX ORDER — LOPERAMIDE HCL 2 MG
2 TABLET ORAL
Qty: 0 | Refills: 0 | DISCHARGE

## 2019-08-29 RX ORDER — NIFEDIPINE 30 MG
1 TABLET, EXTENDED RELEASE 24 HR ORAL
Qty: 30 | Refills: 1
Start: 2019-08-29 | End: 2019-10-27

## 2019-08-29 RX ORDER — FUROSEMIDE 40 MG
0 TABLET ORAL
Qty: 0 | Refills: 0 | DISCHARGE

## 2019-08-29 RX ORDER — FUROSEMIDE 40 MG
1 TABLET ORAL
Qty: 0 | Refills: 0 | DISCHARGE

## 2019-08-29 RX ORDER — LOSARTAN POTASSIUM 100 MG/1
0 TABLET, FILM COATED ORAL
Qty: 0 | Refills: 0 | DISCHARGE

## 2019-08-29 RX ORDER — OXYCODONE HYDROCHLORIDE 5 MG/1
1 TABLET ORAL
Qty: 0 | Refills: 0 | DISCHARGE

## 2019-08-29 RX ORDER — METOPROLOL TARTRATE 50 MG
0 TABLET ORAL
Qty: 0 | Refills: 0 | DISCHARGE

## 2019-08-29 RX ADMIN — Medication 10 MILLIGRAM(S): at 05:07

## 2019-08-29 NOTE — DISCHARGE NOTE NURSING/CASE MANAGEMENT/SOCIAL WORK - PATIENT PORTAL LINK FT
You can access the FollowMyHealth Patient Portal offered by [facility] by registering at the following website: [email or web address]. By joining [facility]’s FollowMyHealth portal, you will also be able to view your health information using other applications (apps) compatible with our system.

## 2019-08-29 NOTE — DISCHARGE NOTE PROVIDER - NSDCCPCAREPLAN_GEN_ALL_CORE_FT
PRINCIPAL DISCHARGE DIAGNOSIS  Diagnosis: Symptomatic bradycardia  Assessment and Plan of Treatment: Stop metoprolol. Decrease nifedipine. dose. Follow up with your primary doctor within 1 week of discharge & cardiology in 1 week      SECONDARY DISCHARGE DIAGNOSES  Diagnosis: Acute kidney injury superimposed on chronic kidney disease  Assessment and Plan of Treatment: Stop lasix 40 & losartan 100. BP controlled without them  f/u BMP in 1 week  Resume Lasix & losartan when Cr close to baseline  Follow up with your primary doctor within 1 week of discharge       Diagnosis: Atrial fibrillation with slow ventricular response  Assessment and Plan of Treatment: Stop metoprolol. COntinue eliquis. Follow up with your primary doctor within 1 week of discharge & cardiology in 1 week PRINCIPAL DISCHARGE DIAGNOSIS  Diagnosis: Symptomatic bradycardia  Assessment and Plan of Treatment: Stop metoprolol. Decrease nifedipine. dose. Follow up with your primary doctor within 1 week of discharge & cardiology in 1 week. Patient cleared for doing Physical therapy as outpatient      SECONDARY DISCHARGE DIAGNOSES  Diagnosis: Acute kidney injury superimposed on chronic kidney disease  Assessment and Plan of Treatment: Stop lasix 40 & losartan 100. BP controlled without them  f/u BMP in 1 week  Resume Lasix & losartan when Cr close to baseline  Follow up with your primary doctor within 1 week of discharge       Diagnosis: Atrial fibrillation with slow ventricular response  Assessment and Plan of Treatment: Stop metoprolol. COntinue eliquis. Follow up with your primary doctor within 1 week of discharge & cardiology in 1 week

## 2019-08-29 NOTE — DISCHARGE NOTE PROVIDER - CARE PROVIDER_API CALL
Campos Fu)  Cardiology; Internal Medicine  [address] [place] [address], [place] [address]  Phone: [phone]  Fax: [phone]  Follow Up Time:

## 2019-08-29 NOTE — PROGRESS NOTE ADULT - ASSESSMENT
74 year old male with DM 2, paroxysmal AF (on Apixaban), hypertension, CKD (Cr 1.63-2.65), hyperparathyroidism, multiple myeloma s/p stem cell transplant/XRT, sickle cell trait, prostate cancer status post surgery who presented to [facility] ED 8/28/19 via EMS following a syncopal episode with associated AF w/ slow VR following possible accidental duplication of regular BB dose. Observed overnight off all beta blockade w/ normalization of ventricular rates (60s-70s bpm) and confirmation of appropriate chronotropic response.     Plan:   Eliquis resumed.   Lasix & losartan d/c'd due to LUIZ.   BP well controlled - plan for d/c home on nifedipine only. Do not resume metoprolol.   No barriers to d/c from EP service perspective.   Will sign off as to EP. Thank you for allowing us to participate in the care of this patient. Please call EP service with questions, concerns or further arrhythmia issues.   Above d/w Mary Fu and Zahida.   Further dispo per primary team.

## 2019-08-29 NOTE — DISCHARGE NOTE PROVIDER - HOSPITAL COURSE
74 year old male with DM 2, paroxysmal AF (on Apixaban), hypertension, CKD (Cr 1.63-2.65), hyperparathyroidism, multiple myeloma s/p stem cell transplant/XRT, sickle cell trait, prostate cancer status post surgery who presented with syncope and bradycardia. The patient states he woke up this morning and was sitting at the edge of the bed. He took his medications and the next thing he remembers is waking up on the floor with EMS assessing him. He denies having any chest pain, palpitations, dizziness, lightheadedness, dyspnea, fevers, chills, cough, nausea, vomiting, diarrhea, dysuria, hematuria, hematochezia, or melena. He does note that over the past three days he has had increased fatigue and tiredness. He manages his medications on his own and has them in a pill box. He does not feel he has taken any more medications than he is prescribed. At his house, ECG by EMS reveals atrial flutter with slow ventricular response with HR to the 30-40s. He was given IV Atropine 0.5mg with minimal effect on his rate. Upon arrival to the ER, he was noted to be in AF with slow ventricular response and was given an additional IV Atropine 0.5mg x1 with minimal improvement in HR to the 50s. Currently pt's BP is 119/50 & HR is 63. Currently denying any symptoms. Pt is on Nifedipine & metoprolol at home & denies any med changes recently. Admits taking extra tizanidine & oxycodone prior to the event due to body ache.                 Syncope likely from combination of BB, CCB, opiates & Tizanidine-    Currently pt's BP is 127/79 & HR is 70's    - Tele monitoring overnight with no events    - EKG essentially unchanged from prior    - D/c metoprolol 25 qD as per EPS, c/w Nifedipine at lower dose    -Hold opiates & Tizanidine    Cardio & EPS appreciated. Cleared by EPS standpoint. No need for PPM        hx of HTN    -  hypotensive on admission, now normalized, Hold lasix 40 & losartan 100 .Resume nifedipine at lower dose.        HLD    - c/w statin        DM 2-    c/w home meds sitagliptin 50 on d/c        Paroxysmal AF (on Apixaban)-    Hold BB     Resume eliquis            LUIZ on CKD stage 3    Baseline Cr 1.8, now 2.26    Avoid nephrotoxic agents    Hold lasix 40 & losartan 100 tonight    f/u BMP    Resume Lasix & losartan when Cr close to baseline                PHYSICAL EXAM-    GENERAL: NAD, well-groomed, well-developed    HEAD:  Atraumatic, Normocephalic    EYES: EOMI, PERRLA, conjunctiva and sclera clear    NECK: Supple, No JVD, Normal thyroid    NERVOUS SYSTEM:  Alert & Oriented X3, Motor Strength 5/5 B/L upper and lower extremities; DTRs 2+ intact and symmetric    CHEST/LUNG: Clear to percussion bilaterally; No rales, rhonchi, wheezing, or rubs    HEART: Regular rate and rhythm; No murmurs, rubs, or gallops    ABDOMEN: Soft, Nontender, Nondistended; Bowel sounds present    EXTREMITIES:  2+ Peripheral Pulses, No clubbing, cyanosis, or edema    SKIN: No rashes or lesions        VSS. Medically stable for d/c

## 2019-08-29 NOTE — PROGRESS NOTE ADULT - ATTENDING COMMENTS
I have personally seen, examined, and participated in the care of this patient. I have reviewed all pertinent clinical information, including history, physical exam, plan, and the PA/NP's note and agree except as noted below.    74 year old male with DM 2, paroxysmal AF (on Apixaban), hypertension, CKD (Cr 1.63-2.65), hyperparathyroidism, multiple myeloma s/p stem cell transplant/XRT, sickle cell trait, prostate cancer status post surgery who presented with syncopal episode found to have AF with slow ventricular response. BB held with improvement in HR and symptoms. No indication for PM at this time, patient to be discharged on continuation of anticoagulation and without BB. The patient's EP (Dr. Cm Damon) was notified of these changes.    Thank you for this consultation. EP will sign off. Please contact EP team with any questions.    Campos Fu MD  Clinical Cardiac Electrophysiology

## 2019-08-29 NOTE — PROGRESS NOTE ADULT - SUBJECTIVE AND OBJECTIVE BOX
Pt doing well overnight without event. Denies complaint. HRs 60s-70s bpm w/ appropriate increase when OOB/ambulating.     MEDICATIONS  (STANDING):  apixaban 5 milliGRAM(s) Oral every 12 hours  atorvastatin 80 milliGRAM(s) Oral at bedtime  dextrose 5%. 1000 milliLiter(s) (50 mL/Hr) IV Continuous <Continuous>  dextrose 50% Injectable 12.5 Gram(s) IV Push once  dextrose 50% Injectable 25 Gram(s) IV Push once  dextrose 50% Injectable 25 Gram(s) IV Push once  insulin lispro (HumaLOG) corrective regimen sliding scale   SubCutaneous three times a day before meals  predniSONE   Tablet 10 milliGRAM(s) Oral daily    MEDICATIONS  (PRN):  dextrose 40% Gel 15 Gram(s) Oral once PRN Blood Glucose LESS THAN 70 milliGRAM(s)/deciliter  glucagon  Injectable 1 milliGRAM(s) IntraMuscular once PRN Glucose LESS THAN 70 milligrams/deciliter      Allergies  lisinopril (Angioedema)      Vital Signs Last 24 Hrs  T(C): 36.9 (29 Aug 2019 15:20), Max: 37.7 (29 Aug 2019 05:01)  T(F): 98.5 (29 Aug 2019 15:20), Max: 99.9 (29 Aug 2019 05:01)  HR: 74 (29 Aug 2019 15:20) (61 - 84)  BP: 127/79 (29 Aug 2019 15:20) (119/59 - 159/63)  BP(mean): --  RR: 18 (29 Aug 2019 15:20) (16 - 19)  SpO2: 96% (29 Aug 2019 15:20) (95% - 98%)    Physical Exam:  Constitutional: NAD, AAOx3  Cardiovascular: +S1S2 regular rate, mildly irregular rhythm  Pulmonary: CTA b/l, unlabored  GI: soft NTND +BS  Extremities: trace pitting edema to b/l ankles, +distal pulses b/l  Neuro: non focal, MURCIA x4    LABS:                        9.2    5.05  )-----------( 240      ( 29 Aug 2019 06:07 )             28.4     08-29    139  |  109<H>  |  33.0<H>  ----------------------------<  107  4.3   |  19.0<L>  |  2.04<H>    Ca    8.3<L>      29 Aug 2019 06:07  Phos  2.8     08-29  Mg     2.0     08-29    TPro  6.0<L>  /  Alb  3.3  /  TBili  0.2<L>  /  DBili  x   /  AST  20  /  ALT  19  /  AlkPhos  54  08-28    PT/INR - ( 28 Aug 2019 10:51 )   PT: 16.2 sec;   INR: 1.39 ratio         PTT - ( 28 Aug 2019 10:51 )  PTT:33.4 sec      RADIOLOGY & ADDITIONAL TESTS:  < from: TTE Echo Complete w/Doppler (08.28.19 @ 21:08) >  PHYSICIAN INTERPRETATION:  Left Ventricle: The left ventricular internal cavity size is normal.   Global LV systolic function was hyperdynamic. Left ventricular ejection   fraction, by visual estimation, is >75%. The mitralin-flow pattern   reveals no discernable A-wave, therefore no comment on diastolic function   can be made.  Right Ventricle: The right ventricle was not well visualized.  Left Atrium: Mildly enlarged left atrium.  Right Atrium: Normal right atrial size.  Pericardium: There is no evidence of pericardial effusion.  Mitral Valve: Thickening of the anterior and posterior mitral valve   leaflets. There is mild mitral annular calcification. Mild mitral valve   regurgitation is seen.  Tricuspid Valve: The tricuspid valve is not well seen. Mild tricuspid   regurgitation is visualized. Estimated pulmonary artery systolic pressure   is 46.0 mmHg assuming a right atrial pressure of 3 mmHg, which is   consistent with mild pulmonary hypertension.  Aortic Valve: The aortic valve is trileaflet. Sclerotic aortic valve with   normal opening. No evidence of aortic valve regurgitation is seen.  Pulmonic Valve: The pulmonic valve was not well visualized.  Aorta: There is dilatation of the aortic root. Dilated Ao root at 3.9cm.  Pulmonary Artery: The pulmonary artery is not well seen.  Venous: The pulmonary veins were not well visualized. The inferior vena   cava is normal. The inferior vena cava was normal sized, with respiratory   size variation greaterthan 50%.       Summary:   1. Left ventricular ejection fraction, by visual estimation, is >75%.   2. Hyperdynamic global left ventricular systolic function.   3. There is mild concentric left ventricular hypertrophy.   4. Mild mitral annular calcification.   5. Thickening of the anterior and posterior mitral valve leaflets.   6. Sclerotic aortic valve with normal opening.   7. Dilatation of the aortic root.   8. Dilated Ao root at 3.9cm.    MD Bart Electronically signed on 8/29/2019 at 11:22:11 AM    < end of copied text >

## 2019-09-29 PROCEDURE — 85610 PROTHROMBIN TIME: CPT

## 2019-09-29 PROCEDURE — 85027 COMPLETE CBC AUTOMATED: CPT

## 2019-09-29 PROCEDURE — 96374 THER/PROPH/DIAG INJ IV PUSH: CPT

## 2019-09-29 PROCEDURE — 99291 CRITICAL CARE FIRST HOUR: CPT | Mod: 25

## 2019-09-29 PROCEDURE — 84100 ASSAY OF PHOSPHORUS: CPT

## 2019-09-29 PROCEDURE — 80048 BASIC METABOLIC PNL TOTAL CA: CPT

## 2019-09-29 PROCEDURE — 86850 RBC ANTIBODY SCREEN: CPT

## 2019-09-29 PROCEDURE — 84484 ASSAY OF TROPONIN QUANT: CPT

## 2019-09-29 PROCEDURE — 86900 BLOOD TYPING SEROLOGIC ABO: CPT

## 2019-09-29 PROCEDURE — 70450 CT HEAD/BRAIN W/O DYE: CPT

## 2019-09-29 PROCEDURE — 93306 TTE W/DOPPLER COMPLETE: CPT

## 2019-09-29 PROCEDURE — 82962 GLUCOSE BLOOD TEST: CPT

## 2019-09-29 PROCEDURE — 80053 COMPREHEN METABOLIC PANEL: CPT

## 2019-09-29 PROCEDURE — 93005 ELECTROCARDIOGRAM TRACING: CPT

## 2019-09-29 PROCEDURE — 71045 X-RAY EXAM CHEST 1 VIEW: CPT

## 2019-09-29 PROCEDURE — 36415 COLL VENOUS BLD VENIPUNCTURE: CPT

## 2019-09-29 PROCEDURE — 84443 ASSAY THYROID STIM HORMONE: CPT

## 2019-09-29 PROCEDURE — 85730 THROMBOPLASTIN TIME PARTIAL: CPT

## 2019-09-29 PROCEDURE — 72125 CT NECK SPINE W/O DYE: CPT

## 2019-09-29 PROCEDURE — 86901 BLOOD TYPING SEROLOGIC RH(D): CPT

## 2019-09-29 PROCEDURE — 83036 HEMOGLOBIN GLYCOSYLATED A1C: CPT

## 2019-09-29 PROCEDURE — 86803 HEPATITIS C AB TEST: CPT

## 2019-09-29 PROCEDURE — 83735 ASSAY OF MAGNESIUM: CPT

## 2021-04-05 ENCOUNTER — EMERGENCY (EMERGENCY)
Facility: HOSPITAL | Age: 76
LOS: 1 days | Discharge: HOME | End: 2021-04-05
Attending: EMERGENCY MEDICINE
Payer: MEDICARE

## 2021-04-05 VITALS
SYSTOLIC BLOOD PRESSURE: 162 MMHG | OXYGEN SATURATION: 96 % | HEART RATE: 72 BPM | DIASTOLIC BLOOD PRESSURE: 75 MMHG | RESPIRATION RATE: 18 BRPM | HEIGHT: 72 IN | TEMPERATURE: 98 F

## 2021-04-05 VITALS
OXYGEN SATURATION: 97 % | HEART RATE: 64 BPM | DIASTOLIC BLOOD PRESSURE: 63 MMHG | RESPIRATION RATE: 18 BRPM | SYSTOLIC BLOOD PRESSURE: 139 MMHG | TEMPERATURE: 99 F

## 2021-04-05 LAB
ALBUMIN SERPL ELPH-MCNC: 3.6 G/DL — SIGNIFICANT CHANGE UP (ref 3.3–5.2)
ALP SERPL-CCNC: 87 U/L — SIGNIFICANT CHANGE UP (ref 40–120)
ALT FLD-CCNC: 23 U/L — SIGNIFICANT CHANGE UP
ANION GAP SERPL CALC-SCNC: 12 MMOL/L — SIGNIFICANT CHANGE UP (ref 5–17)
AST SERPL-CCNC: 18 U/L — SIGNIFICANT CHANGE UP
BASOPHILS # BLD AUTO: 0.05 K/UL — SIGNIFICANT CHANGE UP (ref 0–0.2)
BASOPHILS NFR BLD AUTO: 0.7 % — SIGNIFICANT CHANGE UP (ref 0–2)
BILIRUB SERPL-MCNC: 0.2 MG/DL — LOW (ref 0.4–2)
BUN SERPL-MCNC: 40 MG/DL — HIGH (ref 8–20)
CALCIUM SERPL-MCNC: 8.8 MG/DL — SIGNIFICANT CHANGE UP (ref 8.6–10.2)
CHLORIDE SERPL-SCNC: 104 MMOL/L — SIGNIFICANT CHANGE UP (ref 98–107)
CO2 SERPL-SCNC: 23 MMOL/L — SIGNIFICANT CHANGE UP (ref 22–29)
CREAT SERPL-MCNC: 2.43 MG/DL — HIGH (ref 0.5–1.3)
EOSINOPHIL # BLD AUTO: 0.02 K/UL — SIGNIFICANT CHANGE UP (ref 0–0.5)
EOSINOPHIL NFR BLD AUTO: 0.3 % — SIGNIFICANT CHANGE UP (ref 0–6)
GLUCOSE SERPL-MCNC: 113 MG/DL — HIGH (ref 70–99)
HCT VFR BLD CALC: 33.8 % — LOW (ref 39–50)
HGB BLD-MCNC: 10.8 G/DL — LOW (ref 13–17)
IMM GRANULOCYTES NFR BLD AUTO: 0.4 % — SIGNIFICANT CHANGE UP (ref 0–1.5)
LYMPHOCYTES # BLD AUTO: 2.19 K/UL — SIGNIFICANT CHANGE UP (ref 1–3.3)
LYMPHOCYTES # BLD AUTO: 29.7 % — SIGNIFICANT CHANGE UP (ref 13–44)
MCHC RBC-ENTMCNC: 28.3 PG — SIGNIFICANT CHANGE UP (ref 27–34)
MCHC RBC-ENTMCNC: 32 GM/DL — SIGNIFICANT CHANGE UP (ref 32–36)
MCV RBC AUTO: 88.7 FL — SIGNIFICANT CHANGE UP (ref 80–100)
MONOCYTES # BLD AUTO: 1.09 K/UL — HIGH (ref 0–0.9)
MONOCYTES NFR BLD AUTO: 14.8 % — HIGH (ref 2–14)
NEUTROPHILS # BLD AUTO: 3.99 K/UL — SIGNIFICANT CHANGE UP (ref 1.8–7.4)
NEUTROPHILS NFR BLD AUTO: 54.1 % — SIGNIFICANT CHANGE UP (ref 43–77)
PLATELET # BLD AUTO: 234 K/UL — SIGNIFICANT CHANGE UP (ref 150–400)
POTASSIUM SERPL-MCNC: 4.4 MMOL/L — SIGNIFICANT CHANGE UP (ref 3.5–5.3)
POTASSIUM SERPL-SCNC: 4.4 MMOL/L — SIGNIFICANT CHANGE UP (ref 3.5–5.3)
PROT SERPL-MCNC: 7.4 G/DL — SIGNIFICANT CHANGE UP (ref 6.6–8.7)
RBC # BLD: 3.81 M/UL — LOW (ref 4.2–5.8)
RBC # FLD: 17.2 % — HIGH (ref 10.3–14.5)
SODIUM SERPL-SCNC: 139 MMOL/L — SIGNIFICANT CHANGE UP (ref 135–145)
TROPONIN T SERPL-MCNC: 0.01 NG/ML — SIGNIFICANT CHANGE UP (ref 0–0.06)
WBC # BLD: 7.37 K/UL — SIGNIFICANT CHANGE UP (ref 3.8–10.5)
WBC # FLD AUTO: 7.37 K/UL — SIGNIFICANT CHANGE UP (ref 3.8–10.5)

## 2021-04-05 PROCEDURE — 71045 X-RAY EXAM CHEST 1 VIEW: CPT | Mod: 26

## 2021-04-05 PROCEDURE — 99284 EMERGENCY DEPT VISIT MOD MDM: CPT

## 2021-04-05 PROCEDURE — 93010 ELECTROCARDIOGRAM REPORT: CPT

## 2021-04-05 RX ORDER — LIDOCAINE 4 G/100G
1 CREAM TOPICAL ONCE
Refills: 0 | Status: COMPLETED | OUTPATIENT
Start: 2021-04-05 | End: 2021-04-05

## 2021-04-05 RX ORDER — MORPHINE SULFATE 50 MG/1
4 CAPSULE, EXTENDED RELEASE ORAL ONCE
Refills: 0 | Status: DISCONTINUED | OUTPATIENT
Start: 2021-04-05 | End: 2021-04-05

## 2021-04-05 RX ORDER — METHOCARBAMOL 500 MG/1
1500 TABLET, FILM COATED ORAL ONCE
Refills: 0 | Status: COMPLETED | OUTPATIENT
Start: 2021-04-05 | End: 2021-04-05

## 2021-04-05 RX ADMIN — MORPHINE SULFATE 4 MILLIGRAM(S): 50 CAPSULE, EXTENDED RELEASE ORAL at 21:00

## 2021-04-05 RX ADMIN — LIDOCAINE 1 PATCH: 4 CREAM TOPICAL at 21:00

## 2021-04-05 RX ADMIN — METHOCARBAMOL 1500 MILLIGRAM(S): 500 TABLET, FILM COATED ORAL at 21:00

## 2021-04-05 NOTE — ED ADULT TRIAGE NOTE - CHIEF COMPLAINT QUOTE
PT presents to ED for headache x4 days radiating down neck. No relief with 2 oxycodone PTA. Denies nausea vomiting or worsening blurred vision

## 2021-04-05 NOTE — ED PROVIDER NOTE - OBJECTIVE STATEMENT
75 y/o M pt with significant PMHx of Afib, CKD, DM, HTN, multiple myeloma, and prostate CA presents to the ED c/o HA, neck pain, upper CP, and shoulder pain x4 days. He does not know what caused his pain. He took Oxycodone without relief. He reports having similar pain in the past but never this severe. He is on Eliquis. He reports some difficulty breathing and vision change chronically and are not worse than baseline. Denies n/v, new numbness, new tingling, fevers, sick contacts, recent fall, recent trauma, and new mattress or pillow. 75 y/o M pt with significant PMHx of Afib on eliquis, CKD, DM, HTN, multiple myeloma, and prostate CA presents to the ED c/o HA, neck pain, upper CP, and shoulder pain x4 days. He does not know what caused his pain. He took Oxycodone without relief. He reports having similar pain in the past but never this severe. He reports some difficulty breathing and vision change chronically and are not worse than baseline. Denies n/v, new numbness, new tingling, fevers, sick contacts, recent fall, recent trauma, and new mattress or pillow.

## 2021-04-05 NOTE — ED PROVIDER NOTE - NEURO NEGATIVE STATEMENT, MLM
no loss of consciousness, no gait abnormality, no headache, no new sensory deficits, and no new weakness.

## 2021-04-05 NOTE — ED PROVIDER NOTE - PATIENT PORTAL LINK FT
You can access the FollowMyHealth Patient Portal offered by [facility] by registering at the following website: [email or web address]. By joining [employer]’s FollowMyHealth portal, you will also be able to view your health information using other applications (apps) compatible with our system.

## 2021-04-05 NOTE — ED ADULT NURSE NOTE - NSIMPLEMENTINTERV_GEN_ALL_ED
Implemented All Fall Risk Interventions:  [place] to call system. Call bell, personal items and telephone within reach. Instruct patient to call for assistance. Room bathroom lighting operational. Non-slip footwear when patient is off stretcher. Physically safe environment: no spills, clutter or unnecessary equipment. Stretcher in lowest position, wheels locked, appropriate side rails in place. Provide visual cue, wrist band, yellow gown, etc. Monitor gait and stability. Monitor for mental status changes and reorient to person, place, and time. Review medications for side effects contributing to fall risk. Reinforce activity limits and safety measures with patient and family.

## 2021-04-05 NOTE — ED PROVIDER NOTE - NSFOLLOWUPINSTRUCTIONS_ED_ALL_ED_FT
- Follow up with your doctor within 2-3 days.   - Bring results with you to the appointment.   - Take Tylenol (Acetaminophen) 650mg or Motrin (Ibuprofen/Advil) 600mg every 6 hours as needed for pain.   - For worsened pain take Robaxin 1500 mg every 8 hours as needed. Apply Lidocaine patch for up to 12 hours per day.   - Return to the ED for any new or worsening symptoms.

## 2021-04-05 NOTE — ED PROVIDER NOTE - CLINICAL SUMMARY MEDICAL DECISION MAKING FREE TEXT BOX
Pt with 4 days of HA and neck pain. No infectious or meningitic sx. Labs, pain control, CT head, and reassess.

## 2021-04-06 PROBLEM — E11.9 TYPE 2 DIABETES MELLITUS WITHOUT COMPLICATIONS: Chronic | Status: ACTIVE | Noted: 2019-08-28

## 2021-04-06 PROBLEM — I48.91 UNSPECIFIED ATRIAL FIBRILLATION: Chronic | Status: ACTIVE | Noted: 2019-08-28

## 2021-04-06 PROCEDURE — 84484 ASSAY OF TROPONIN QUANT: CPT

## 2021-04-06 PROCEDURE — 70450 CT HEAD/BRAIN W/O DYE: CPT

## 2021-04-06 PROCEDURE — 70450 CT HEAD/BRAIN W/O DYE: CPT | Mod: 26,ME

## 2021-04-06 PROCEDURE — 96374 THER/PROPH/DIAG INJ IV PUSH: CPT

## 2021-04-06 PROCEDURE — 85025 COMPLETE CBC W/AUTO DIFF WBC: CPT

## 2021-04-06 PROCEDURE — 93005 ELECTROCARDIOGRAM TRACING: CPT

## 2021-04-06 PROCEDURE — G1004: CPT

## 2021-04-06 PROCEDURE — 99285 EMERGENCY DEPT VISIT HI MDM: CPT | Mod: 25

## 2021-04-06 PROCEDURE — 80053 COMPREHEN METABOLIC PANEL: CPT

## 2021-04-06 PROCEDURE — 36415 COLL VENOUS BLD VENIPUNCTURE: CPT

## 2021-04-06 PROCEDURE — 71045 X-RAY EXAM CHEST 1 VIEW: CPT

## 2021-04-06 RX ORDER — METHOCARBAMOL 500 MG/1
2 TABLET, FILM COATED ORAL
Qty: 18 | Refills: 0
Start: 2021-04-06 | End: 2021-04-08

## 2021-04-06 RX ORDER — LIDOCAINE 4 G/100G
1 CREAM TOPICAL
Qty: 5 | Refills: 0
Start: 2021-04-06 | End: 2021-04-10

## 2021-04-06 RX ORDER — METHOCARBAMOL 500 MG/1
750 TABLET, FILM COATED ORAL ONCE
Refills: 0 | Status: COMPLETED | OUTPATIENT
Start: 2021-04-06 | End: 2021-04-06

## 2021-04-06 RX ADMIN — METHOCARBAMOL 750 MILLIGRAM(S): 500 TABLET, FILM COATED ORAL at 01:38

## 2021-04-07 NOTE — ED POST DISCHARGE NOTE - DETAILS
spoke to patient and granddaughter has follow up with PCP Caridad Vanegas, informed of results, denies any SOB, or difficulty. spoke to patient and granddaughter has follow up with PCP Caridad Vanegas, informed of results, denies any SOB, or difficulty breathing.

## 2021-04-07 NOTE — ED POST DISCHARGE NOTE - RESULT SUMMARY
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  LMOM to call ED, certified letter sent

## 2021-05-07 ENCOUNTER — EMERGENCY (EMERGENCY)
Facility: HOSPITAL | Age: 76
LOS: 1 days | Discharge: HOME | End: 2021-05-07
Attending: STUDENT IN AN ORGANIZED HEALTH CARE EDUCATION/TRAINING PROGRAM
Payer: MEDICARE

## 2021-05-07 VITALS
TEMPERATURE: 98 F | WEIGHT: 259.93 LBS | RESPIRATION RATE: 18 BRPM | HEIGHT: 72 IN | SYSTOLIC BLOOD PRESSURE: 139 MMHG | HEART RATE: 77 BPM | DIASTOLIC BLOOD PRESSURE: 71 MMHG | OXYGEN SATURATION: 97 %

## 2021-05-07 VITALS
SYSTOLIC BLOOD PRESSURE: 119 MMHG | OXYGEN SATURATION: 98 % | HEART RATE: 63 BPM | DIASTOLIC BLOOD PRESSURE: 72 MMHG | RESPIRATION RATE: 18 BRPM | TEMPERATURE: 98 F

## 2021-05-07 LAB
ALBUMIN SERPL ELPH-MCNC: 4 G/DL — SIGNIFICANT CHANGE UP (ref 3.3–5.2)
ALP SERPL-CCNC: 95 U/L — SIGNIFICANT CHANGE UP (ref 40–120)
ALT FLD-CCNC: 26 U/L — SIGNIFICANT CHANGE UP
ANION GAP SERPL CALC-SCNC: 15 MMOL/L — SIGNIFICANT CHANGE UP (ref 5–17)
AST SERPL-CCNC: 22 U/L — SIGNIFICANT CHANGE UP
BASOPHILS # BLD AUTO: 0.08 K/UL — SIGNIFICANT CHANGE UP (ref 0–0.2)
BASOPHILS NFR BLD AUTO: 1.1 % — SIGNIFICANT CHANGE UP (ref 0–2)
BILIRUB SERPL-MCNC: 0.3 MG/DL — LOW (ref 0.4–2)
BUN SERPL-MCNC: 22 MG/DL — HIGH (ref 8–20)
CALCIUM SERPL-MCNC: 9.4 MG/DL — SIGNIFICANT CHANGE UP (ref 8.6–10.2)
CHLORIDE SERPL-SCNC: 105 MMOL/L — SIGNIFICANT CHANGE UP (ref 98–107)
CO2 SERPL-SCNC: 19 MMOL/L — LOW (ref 22–29)
CREAT SERPL-MCNC: 2.29 MG/DL — HIGH (ref 0.5–1.3)
EOSINOPHIL # BLD AUTO: 0.08 K/UL — SIGNIFICANT CHANGE UP (ref 0–0.5)
EOSINOPHIL NFR BLD AUTO: 1.1 % — SIGNIFICANT CHANGE UP (ref 0–6)
GLUCOSE SERPL-MCNC: 112 MG/DL — HIGH (ref 70–99)
HCT VFR BLD CALC: 36 % — LOW (ref 39–50)
HGB BLD-MCNC: 11.2 G/DL — LOW (ref 13–17)
IMM GRANULOCYTES NFR BLD AUTO: 0.3 % — SIGNIFICANT CHANGE UP (ref 0–1.5)
LIDOCAIN IGE QN: 18 U/L — LOW (ref 22–51)
LYMPHOCYTES # BLD AUTO: 3.39 K/UL — HIGH (ref 1–3.3)
LYMPHOCYTES # BLD AUTO: 45 % — HIGH (ref 13–44)
MCHC RBC-ENTMCNC: 27.9 PG — SIGNIFICANT CHANGE UP (ref 27–34)
MCHC RBC-ENTMCNC: 31.1 GM/DL — LOW (ref 32–36)
MCV RBC AUTO: 89.6 FL — SIGNIFICANT CHANGE UP (ref 80–100)
MONOCYTES # BLD AUTO: 0.92 K/UL — HIGH (ref 0–0.9)
MONOCYTES NFR BLD AUTO: 12.2 % — SIGNIFICANT CHANGE UP (ref 2–14)
NEUTROPHILS # BLD AUTO: 3.04 K/UL — SIGNIFICANT CHANGE UP (ref 1.8–7.4)
NEUTROPHILS NFR BLD AUTO: 40.3 % — LOW (ref 43–77)
PLATELET # BLD AUTO: 257 K/UL — SIGNIFICANT CHANGE UP (ref 150–400)
POTASSIUM SERPL-MCNC: 4.2 MMOL/L — SIGNIFICANT CHANGE UP (ref 3.5–5.3)
POTASSIUM SERPL-SCNC: 4.2 MMOL/L — SIGNIFICANT CHANGE UP (ref 3.5–5.3)
PROT SERPL-MCNC: 7.8 G/DL — SIGNIFICANT CHANGE UP (ref 6.6–8.7)
RBC # BLD: 4.02 M/UL — LOW (ref 4.2–5.8)
RBC # FLD: 17.2 % — HIGH (ref 10.3–14.5)
SODIUM SERPL-SCNC: 138 MMOL/L — SIGNIFICANT CHANGE UP (ref 135–145)
WBC # BLD: 7.53 K/UL — SIGNIFICANT CHANGE UP (ref 3.8–10.5)
WBC # FLD AUTO: 7.53 K/UL — SIGNIFICANT CHANGE UP (ref 3.8–10.5)

## 2021-05-07 PROCEDURE — 93010 ELECTROCARDIOGRAM REPORT: CPT

## 2021-05-07 PROCEDURE — 74176 CT ABD & PELVIS W/O CONTRAST: CPT

## 2021-05-07 PROCEDURE — 84443 ASSAY THYROID STIM HORMONE: CPT

## 2021-05-07 PROCEDURE — 80053 COMPREHEN METABOLIC PANEL: CPT

## 2021-05-07 PROCEDURE — 93005 ELECTROCARDIOGRAM TRACING: CPT

## 2021-05-07 PROCEDURE — 36415 COLL VENOUS BLD VENIPUNCTURE: CPT

## 2021-05-07 PROCEDURE — 96374 THER/PROPH/DIAG INJ IV PUSH: CPT

## 2021-05-07 PROCEDURE — 84436 ASSAY OF TOTAL THYROXINE: CPT

## 2021-05-07 PROCEDURE — 83690 ASSAY OF LIPASE: CPT

## 2021-05-07 PROCEDURE — 99284 EMERGENCY DEPT VISIT MOD MDM: CPT | Mod: 25

## 2021-05-07 PROCEDURE — 83735 ASSAY OF MAGNESIUM: CPT

## 2021-05-07 PROCEDURE — 99285 EMERGENCY DEPT VISIT HI MDM: CPT | Mod: GC

## 2021-05-07 PROCEDURE — 85025 COMPLETE CBC W/AUTO DIFF WBC: CPT

## 2021-05-07 RX ORDER — SODIUM CHLORIDE 9 MG/ML
1000 INJECTION INTRAMUSCULAR; INTRAVENOUS; SUBCUTANEOUS ONCE
Refills: 0 | Status: COMPLETED | OUTPATIENT
Start: 2021-05-07 | End: 2021-05-07

## 2021-05-07 RX ORDER — KETOROLAC TROMETHAMINE 30 MG/ML
15 SYRINGE (ML) INJECTION ONCE
Refills: 0 | Status: DISCONTINUED | OUTPATIENT
Start: 2021-05-07 | End: 2021-05-07

## 2021-05-07 RX ORDER — DIATRIZOATE MEGLUMINE 180 MG/ML
30 INJECTION, SOLUTION INTRAVESICAL ONCE
Refills: 0 | Status: COMPLETED | OUTPATIENT
Start: 2021-05-07 | End: 2021-05-07

## 2021-05-07 RX ADMIN — DIATRIZOATE MEGLUMINE 30 MILLILITER(S): 180 INJECTION, SOLUTION INTRAVESICAL at 22:04

## 2021-05-07 RX ADMIN — SODIUM CHLORIDE 1000 MILLILITER(S): 9 INJECTION INTRAMUSCULAR; INTRAVENOUS; SUBCUTANEOUS at 20:24

## 2021-05-07 RX ADMIN — Medication 15 MILLIGRAM(S): at 20:24

## 2021-05-07 NOTE — ED ADULT TRIAGE NOTE - CHIEF COMPLAINT QUOTE
patient brought in by family states that he has been not feeling well for > 2 weeks states neck pain with recent biopsy, and abdominal pain with distention N/D > 1 week

## 2021-05-07 NOTE — ED ADULT NURSE NOTE - OBJECTIVE STATEMENT
Ambulatory accompanied by granddaughter who states that patient has had at least 2 weeks of worsening neck pain (pain is chronic, PMD found a goiter and patient is supposed to have a biopsy next week) but now has posterior neck pain. Patient also reports abdominal bloating and "feeling constantly full like he ate an entire turkey" for about 2 weeks with diarrhea x5 days. These are all chronic issues to the patient. PMH multiple myeloma, DMII, HTN, HLD. Calm and cooperative. 20g PIV inserted to R AC, labs drawn and sent, medicated as ordered. Safety maintained, needs attended, will continue to monitor.

## 2021-05-07 NOTE — ED PROVIDER NOTE - CARE PLAN
Principal Discharge DX:	Abdominal pain  Secondary Diagnosis:	Multiple myeloma  Secondary Diagnosis:	CKD (chronic kidney disease)

## 2021-05-07 NOTE — ED PROVIDER NOTE - ATTENDING CONTRIBUTION TO CARE
I personally saw the patient with the resident, and completed the key components of the history and physical exam. I then discussed the management plan with the resident.  77 y/o M with a significant PMHx of AFib, CKD, DM2, multiple myelomas, and HTN  presents to the ED c/o abdominal pain and neck pain for several weeks. Pt with labs at baseline, no obstructive/inflammatory/infectious process on CT, well appearing, tolerating PO, stable for dc with follow up

## 2021-05-07 NOTE — ED ADULT NURSE NOTE - NSIMPLEMENTINTERV_GEN_ALL_ED
Implemented All Universal Safety Interventions:  [place] to call system. Call bell, personal items and telephone within reach. Instruct patient to call for assistance. Room bathroom lighting operational. Non-slip footwear when patient is off stretcher. Physically safe environment: no spills, clutter or unnecessary equipment. Stretcher in lowest position, wheels locked, appropriate side rails in place.

## 2021-05-07 NOTE — ED PROVIDER NOTE - OBJECTIVE STATEMENT
75 y/o M with a significant PMHx of AFib, CKD, DM2, multiple myelomas, and HTN  presents to the ED c/o abdominal pain and neck pain for several weeks. Pt states that his neck pain is not newly onset and that it feels "like someone is hitting his neck." Pt states that his stomach is "so bloated it feels like he ate a turkey"; pt notes this is not a newly onset symptom either. Pt reports 5 days of newly onset diarrhea. Pt states that he was tired of feeling his chronic symptoms, and the onset of diarrhea caused him to present to the ED today. Pt currently takes Eliquis on a daily basis. Pt has never had an MI or stroke. Pt denies any sudden visual changes or unilateral weakness. Pt has as a noted known allergy to Lisinopril and Cymbalta. Pt denies the use of illicit drugs, tobacco, or excessive EtOH use. Pt denies fevers/chills, ha, loc, focal neuro deficits, cp/sob/palp, cough, n/v/d, urinary symptoms, recent travel and sick contacts.

## 2021-05-07 NOTE — ED PROVIDER NOTE - PATIENT PORTAL LINK FT
You can access the FollowMyHealth Patient Portal offered by [facility] by registering at the following website: [email or web address]. By joining [employer]’s FollowMyHealth portal, you will also be able to view your health information using other applications (apps) compatible with our system.

## 2021-05-07 NOTE — ED PROVIDER NOTE - CLINICAL SUMMARY MEDICAL DECISION MAKING FREE TEXT BOX
77 y/o M with a significant PMHx of AFib, CKD, DM2, multiple myelomas, and HTN  presents to the ED c/o abdominal pain and neck pain for several weeks. 77 y/o M with a significant PMHx of AFib, CKD, DM2, multiple myelomas, and HTN  presents to the ED c/o abdominal pain and neck pain for several weeks. Labs, EKG, CT performed. Patient is now feeling improved and workup is unremarkable for any emergent process.   Patient/family was given full return precautions.  Patient was counseled on red flag syndromes such as fever, severe pain, or focal deficits and advised to return to the ED for these reasons or any reason that was concerning to them.  Patient/ family advised to make close follow up with their primary care provider and specialty clinics (as applicable) to follow up with this visit and continue investigation/treatment.  All questions were answered. Patient/family has shown adequate competence and understanding. Patient/family is agreeable to the plan.

## 2021-05-08 PROCEDURE — 74176 CT ABD & PELVIS W/O CONTRAST: CPT | Mod: 26,ME

## 2021-05-08 PROCEDURE — G1004: CPT

## 2021-07-01 ENCOUNTER — EMERGENCY (EMERGENCY)
Facility: HOSPITAL | Age: 76
LOS: 1 days | Discharge: HOME | End: 2021-07-01
Attending: EMERGENCY MEDICINE
Payer: MEDICARE

## 2021-07-01 VITALS
DIASTOLIC BLOOD PRESSURE: 71 MMHG | OXYGEN SATURATION: 96 % | SYSTOLIC BLOOD PRESSURE: 136 MMHG | RESPIRATION RATE: 18 BRPM | HEIGHT: 72 IN | HEART RATE: 68 BPM | TEMPERATURE: 99 F

## 2021-07-01 PROCEDURE — 99283 EMERGENCY DEPT VISIT LOW MDM: CPT

## 2021-07-01 PROCEDURE — 99284 EMERGENCY DEPT VISIT MOD MDM: CPT

## 2021-07-01 NOTE — ED PROVIDER NOTE - NSFOLLOWUPINSTRUCTIONS_ED_ALL_ED_FT
-Please follow up with the GI physician for further investigation of why you have black stools  -Please:   SEEK IMMEDIATE MEDICAL CARE IF YOU HAVE ANY OF THE FOLLOWING SYMPTOMS: extreme weakness/chest pain/shortness of breath, black or bloody stools, vomiting blood, fainting, fever, or any signs of dehydration.

## 2021-07-01 NOTE — ED PROVIDER NOTE - PHYSICAL EXAMINATION
Gen: Well appearing in NAD  Head: NC/AT  Neck: trachea midline  Resp:  No distress  Abd: soft, non-tender, non-distended   Ext: no deformities  Neuro:  A&O appears non focal  Skin:  Warm and dry as visualized  Psych: Calm, cooperative

## 2021-07-01 NOTE — ED PROVIDER NOTE - PROGRESS NOTE DETAILS
Daron: patient's RN went to draw patient's blood and he declined, requested to leave. Daron: I went to draw the patient's blood work and he again declined, he would like to leave and will follow up with GI.

## 2021-07-01 NOTE — ED PROVIDER NOTE - NS ED ROS FT
Constitutional: no fever, no chills, +fatigue   Head: NC, AT   Eyes: no redness   ENMT: no nasal congestion/drainage, no sore throat   CV: no chest pain, no edema  Resp: no cough, no dyspnea  GI: no abdominal pain, no nausea, no vomiting, no diarrhea, +black stools   : no dysuria, no hematuria   Skin: no lesions, no rashes   Neuro: no LOC, no headache, no sensory deficits, no weakness

## 2021-07-01 NOTE — ED ADULT NURSE NOTE - OBJECTIVE STATEMENT
Patient A%O presents to the ED c/o black stool for several months.  Patient states that he has undergone multiple testing including endoscopy and colonoscopy both of which were unremarkable.  Patient states that he has been feeling more tired x1week.  patient denies any other complaints at this time.  Went to draw patients blood as ordered by MD and patient declined, wishing to leave.  MD made aware.  Patient discharged by resident MD and told to f/u with GI.

## 2021-07-01 NOTE — ED ADULT NURSE NOTE - NSIMPLEMENTINTERV_GEN_ALL_ED
Implemented All Fall Risk Interventions:  [place] to call system. Call bell, personal items and telephone within reach. Instruct patient to call for assistance. Room bathroom lighting operational. Non-slip footwear when patient is off stretcher. Physically safe environment: no spills, clutter or unnecessary equipment. Stretcher in lowest position, wheels locked, appropriate side rails in place. Provide visual cue, wrist band, yellow gown, etc. Monitor gait and stability. Monitor for mental status changes and reorient to person, place, and time. Review medications for side effects contributing to fall risk. Reinforce activity limits and safety measures with patient and family.

## 2021-07-01 NOTE — ED PROVIDER NOTE - OBJECTIVE STATEMENT
76 year old male who presents with black stools. 76 year old male with a history of HTN, DM, and MM (11 years) who presents with black stools. For several months the patient has had black stools. Within the last few months he reports that he has undergone an endoscopy and colonoscopy both of which were unremarkable. He has been feeling more tired for the last 1 week which was what prompted him to come to the ED. No fevers, chills, abdominal pain, vomiting, diarrhea. Takes pepto bismol often. Never smoker. No alcohol use. Notably, patient is a [employer].

## 2021-07-01 NOTE — ED ADULT TRIAGE NOTE - CHIEF COMPLAINT QUOTE
patient states that he has had black stools x a few months, states that he thought it was time to get it checked out. denies any complaints of pain or discomfort

## 2021-07-01 NOTE — ED PROVIDER NOTE - CLINICAL SUMMARY MEDICAL DECISION MAKING FREE TEXT BOX
76 year old male with a history of HTN, DM, and MM (11 years) who presents with black stools for several months. Exam unremarkable. Pt opted to leave prior to lab draw. He was discharged home with GI f/u in addition to return precautions.

## 2021-07-01 NOTE — ED PROVIDER NOTE - PATIENT PORTAL LINK FT
You can access the FollowMyHealth Patient Portal offered by [facility] by registering at the following website: [email or web address]. By joining [employer]’s FollowMyHealth portal, you will also be able to view your health information using other applications (apps) compatible with our system.

## 2021-07-01 NOTE — ED ADULT NURSE NOTE - ALCOHOL PRE SCREEN (AUDIT - C)
SUBJECTIVE:   Chelsie Fairbanks is a 9 year old female who presents to clinic today with mother because of:    Chief Complaint   Patient presents with     Ear Problem     Mouth/Lip Problem        HPI  Concerns:Left  Ear Pain and blister inside mouth    Per mom Concepcion's ears have been bothering her off and on for awhile. She did start to complain more about her ears yesterday.     Last Friday she came her for an appointment and seemed fine so she returned to school  She promptly went to the nurses office and threw up.  Mom went to school to pick her up and gave her a dose of Zofran.  She was running a temp at this time too.  Friday AM she took all her meds one of which is Lexapro, per mom with the addition of Lexapro her behaviors have been pretty good.  Saturday and Sunday did not take her Lexapro and mom could tell she was not feeling the best and behaviors were a little off.  Mom also checked for side effects os Lexapro and somehow saw that combining Zofran with Lexapro can cause QT syndrome so was worried about this all weekend.    She has been not listening to mom and has been using her headphone to decrease extraneous noses as this sets her off.  behaviors at school: she snuck gum one day and lied about it on Tuesday.  Tuesday she started to hang upside down I her chair.  Then on Wednesday mom got call to pick her up, her teacher asked is she could go to the sensory room ( mom reports she may be trying to regulate herself as gum helps to regulate her.)  They tried to have her go see Miss Mcpherson several times.   There were 5 school personnel were chasing her to bring her back into class.  When mom picked her up sh was toto ally regulated and was fine, mom has an adult conversation with her for 2 hours and was fine no further behaviors.  Concepcion told mom that they were watching a movie and the lights were dim and one of the kids was kicking her chair.   Mom is an EMPATH and Concepcion's sister is one too and Concepcion  probably is one also.      When she is taking her Lexapro 5 mg maybe 2 outbursts more so at school but not at home.    Swapnil is having a group to get together for her IEP meeting.          ROS  GENERAL:  As in HPI  SKIN:  NEGATIVE for rash, hives, and eczema.  EYE:  NEGATIVE for pain, discharge, redness, itching and vision problems.  ENT:  As in HPI  RESP:  NEGATIVE for cough, wheezing, and difficulty breathing.  CARDIAC:  NEGATIVE for chest pain and cyanosis.   GI:  As in HPI  :  NEGATIVE for urinary problems.  NEURO:  NEGATIVE for headache and weakness.  ALLERGY:  As in Allergy History  MSK:  NEGATIVE for muscle problems and joint problems.    PROBLEM LIST  Patient Active Problem List    Diagnosis Date Noted     MANISHA (generalized anxiety disorder) 11/01/2018     Priority: Medium     Encopresis with constipation and overflow incontinence 09/05/2018     Priority: Medium     Dental caries 05/30/2018     Priority: Medium     PTSD (post-traumatic stress disorder) 01/31/2017     Priority: Medium     BMI (body mass index), pediatric, 85th to 94th percentile for age, overweight child, prevention plus category 01/31/2017     Priority: Medium     Canker sores oral 08/05/2016     Priority: Medium     Aggressive behavior of child 08/05/2016     Priority: Medium     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 Oropeza who believes she has FPIES to Rice and has an intolerance to dairy and she has had rashes on her face from mustard and green peas and corn and mom wants to avoid these and he is fine with this.  OK to try peanuts, tree nuts, shellfish and finfish.  Avoid liquid soy.       Chronic constipation 02/01/2016     Priority: Medium     Other urinary incontinence 01/20/2016     Priority: Medium     Adjustment disorder with depressed mood 01/18/2016     Priority: Medium     Vitamin D deficiency 06/10/2015     Priority: Medium     Anemia 06/10/2015     Priority:  Medium     Behavior problem in child 01/19/2015     Priority: Medium     Bug bites 06/11/2014     Priority: Medium     Allergy to mold spores 09/09/2013     Priority: Medium     Allergen A alternata         Familial hypercholesteremia 01/31/2013     Priority: Medium     Soy milk protein intolerance 01/25/2013     Priority: Medium     Constipation 10/15/2012     Priority: Medium     Rhinitis 09/25/2012     Priority: Medium     Food intolerance in child 01/19/2012     Priority: Medium     Milk protein intolerance 08/08/2011     Priority: Medium     Health Care Home Tier 2 2010     Priority: Medium     10/31/13 - see care plan in letters    10/25/12- Sent letter updating care coordinator information.  Marci Austin,     11/1/11 - care plan printed and given to mother.Dayami Malave,RN    5/23/11 Letter sent to inform regarding change in coordinator to Rose Haines. Dottie Hermosillo RN    Called mom Kimberly and left her a message inf regards to if she has heard anything from [employer] for samples of Peptamen Jr.. I had faxed over the request last week. We have also provided her with some samples of Alimentium while they have some family hardships. Left my direct line to call.  Liss Sandhu MA  4/6/11              MEDICATIONS  Current Outpatient Medications   Medication Sig Dispense Refill     acetaminophen (CHILDRENS ACETAMINOPHEN) 160 MG/5ML suspension GIVE 10.15ML (325MG) BY MOUTH EVERY 4 HOURS AS NEEDED FOR FEVER OR MILD PAIN. (DO NOT EXCEED 5 DOSES PER DAY) 472 mL 10     ALLERGY RELIEF CHILDRENS 12.5 MG/5ML liquid        bisacodyl (DULCOLAX) 5 MG EC tablet Use as directed for bowel clean out 2 tablet 0     cholecalciferol (VITAMIN D/ D-VI-SOL) 400 UNIT/ML LIQD Take 5 mLs (2,000 Units) by mouth daily 150 mL 3     Diapers & Supplies (PAMPERS UNDERJAMS GIRLS L/XL) MISC 1 each 2 times daily as needed 42 each 11     diphenhydrAMINE (BENADRYL) 12.5 MG/5ML solution Take 12.5 mLs twice a day as needed 237  mL 3     EPINEPHrine (EPIPEN JR 2-HIMANSHU) 0.15 MG/0.3ML injection Inject 0.3 mLs (0.15 mg) into the muscle as needed for anaphylaxis 0.6 mL 1     EPINEPHrine (EPIPEN JR) 0.15 MG/0.3ML injection 2-pack INJECT THE CONTENTS OF 1 SYRINGE (0.3MLS) INTO THE MUSCLE AS NEEDED FOR ANAPHYLAXIS REACTION 0.6 mL 1     EPINEPHrine (EPIPEN/ADRENACLICK/OR ANY BX GENERIC EQUIV) 0.3 MG/0.3ML injection 2-pack Inject 0.3 mLs (0.3 mg) into the muscle as needed for anaphylaxis 0.6 mL 3     escitalopram (LEXAPRO) 5 MG tablet Take 5 mg by mouth daily       ferrous fumarate 65 mg, [place]. FE,-Vitamin C 125 mg (VITRON C)  MG TABS tablet Take 1 tablet by mouth daily 30 tablet 5     ferrous sulfate (JAC-IN-SOL) 75 (15 FE) MG/ML oral drops Take 6.53 mLs (98 mg) by mouth daily 200 mL 3     fexofenadine (ALLEGRA ALLERGY CHILDRENS) 30 MG ODT tab Take 1 tablet (30 mg) by mouth 2 times daily 60 tablet 3     fluticasone (FLONASE) 50 MCG/ACT spray Spray 1-2 sprays into both nostrils daily Need to keep upcoming appointment for further refills 1 Bottle 0     hydrocortisone 2.5 % cream Apply topically 2 times daily Apply to bug bites 2 times a day for up to one week at a time.  Use sparingly. 30 g 3     ibuprofen (CHILDRENS IBUPROFEN 100) 100 MG/5ML suspension Take 15 mLs (300 mg) by mouth every 8 hours as needed for fever or moderate pain 273 mL 10     Melatonin-Pyridoxine (MELATIN PO)        ondansetron (ZOFRAN-ODT) 4 MG ODT tab Take 1 tablet (4 mg) by mouth every 8 hours as needed for nausea 20 tablet 3     order for DME Pampers UnderJams Girls Bedtime Underwear Size S/M. 1 Box 11     polyethylene glycol (MIRALAX) powder Take 17 g (1 capful) by mouth daily 510 g 6     polyethylene glycol (MIRALAX/GLYCOLAX) powder Take 17 g (1 capful) by mouth daily 510 g 11     polyethylene glycol (MIRALAX/GLYCOLAX) powder Use as directed for bowel clean out 238 g 3      ALLERGIES  Allergies   Allergen Reactions     Barley Green Rash and GI Disturbance     Green  beans rash on face and mucus stools and peas     Rice GI Disturbance     Vomiting     Aquaphor [Petrolatum & Lanolin]      Milk Products Rash     Soy GI Disturbance       Reviewed and updated as needed this visit by clinical staff  Tobacco  Allergies  Meds         Reviewed and updated as needed this visit by Provider       OBJECTIVE:     BP 96/60   Pulse 120   Temp 97.5  F (36.4  C) (Oral)   Resp 20   Wt 76 lb (34.5 kg)   SpO2 100%   No height on file for this encounter.  80 %ile based on CDC (Girls, 2-20 Years) weight-for-age data based on Weight recorded on 1/28/2019.  No height and weight on file for this encounter.  No height on file for this encounter.    GENERAL: Active, alert, in no acute distress.  SKIN: Clear. No significant rash, abnormal pigmentation or lesions  HEAD: Normocephalic.  EYES:  No discharge or erythema. Normal pupils and EOM.  RIGHT EAR: normal: no effusions, no erythema, normal landmarks  LEFT EAR: normal: no effusions, no erythema, normal landmarks  NOSE: Normal without discharge.  MOUTH/THROAT: clear oropharynx, erthema noted on left lower gum with white what appears to be a ulcer on her left lower incisor.  NECK: Supple, no masses.  LYMPH NODES: No adenopathy  LUNGS: Clear. No rales, rhonchi, wheezing or retractions  HEART: Regular rhythm. Normal S1/S2. No murmurs.  ABDOMEN: Soft, non-tender, not distended, no masses or hepatosplenomegaly. Bowel sounds normal.       DIAGNOSTICS: None    ASSESSMENT/PLAN:   (H92.02) Otalgia, left  (primary encounter diagnosis)  (H69.82) Dysfunction of left eustachian tube  Comment:   Plan:   I discussed the pathophysiology of eustachian dysfunction and treatment options with emphasis on healthy lifestyle and opening up natural drainage passages.  I discussed the risks and benefits of various over the counter and prescription treatments including short courses of decongestant nasal sprays.     (Z91.89) At risk for side effect of medication  Comment:    Plan:   Discussed with mom that yes the 2 meds Zofran and Lexapro can cause QT syndrome but feel as needed dose of Zofran on an infrequent basis should be safe.  Would recommend not taking together for more than a short few day course.    FOLLOW UP: If not improving or if worsening  next preventive care visit  Face to Face time greater than 25 min with greater than 50 % in counseling on ear pain, potential medication side effects and treatment options.        Gisele Bejarano, PNP, APRN CNP      Statement Selected

## 2021-07-01 NOTE — ED PROVIDER NOTE - CARE PROVIDER_API CALL
Venkat Jaquez)  Gastroenterology; Internal Medicine  [address] [place] [address]  Phone: [phone]  Fax: [phone]  Follow Up Time:

## 2021-08-26 NOTE — ED PROVIDER NOTE - OTHER FINDINGS
Spoke with patient and let him know that he should not have any dental work for 4 months after total knee replacement, DOS 6.22.21.    Patient states he had a crown fall out yesterday.   States his right hip is bothering him again and he would like another hip injection. scheduled for 9.8.21 at 6:45 am for right hip injection in [facility].     Dentist P: [phone]  Spoke with Dentist office and let them know patient had a total joint replacement and patient should not have any dental work for 4 months after surgery, then after that he will require antibiotics prior to any dental procedure for life. They will call patient.             no acute signs of ischemia or infarction,

## 2023-01-10 ENCOUNTER — OFFICE (OUTPATIENT)
Dept: URBAN - METROPOLITAN AREA CLINIC 115 | Facility: CLINIC | Age: 78
Setting detail: OPHTHALMOLOGY
End: 2023-01-10
Payer: MEDICARE

## 2023-01-10 DIAGNOSIS — H35.33: ICD-10-CM

## 2023-01-10 DIAGNOSIS — E11.3293: ICD-10-CM

## 2023-01-10 DIAGNOSIS — Z79.84: ICD-10-CM

## 2023-01-10 DIAGNOSIS — H26.493: ICD-10-CM

## 2023-01-10 PROCEDURE — 92202 OPSCPY EXTND ON/MAC DRAW: CPT | Performed by: OPHTHALMOLOGY

## 2023-01-10 PROCEDURE — 92014 COMPRE OPH EXAM EST PT 1/>: CPT | Performed by: OPHTHALMOLOGY

## 2023-01-10 ASSESSMENT — SPHEQUIV_DERIVED
OS_SPHEQUIV: 0.625
OD_SPHEQUIV: 0.625
OD_SPHEQUIV: 0.625
OS_SPHEQUIV: 0

## 2023-01-10 ASSESSMENT — AXIALLENGTH_DERIVED
OD_AL: 23.7164
OS_AL: 23.2613
OD_AL: 23.7164
OS_AL: 23.5004

## 2023-01-10 ASSESSMENT — KERATOMETRY
METHOD_AUTO_MANUAL: AUTO
OS_K2POWER_DIOPTERS: 44.00
OS_K1POWER_DIOPTERS: 43.50
OD_K2POWER_DIOPTERS: 43.50
OD_AXISANGLE_DEGREES: 090
OS_AXISANGLE_DEGREES: 26
OD_K1POWER_DIOPTERS: 41.50

## 2023-01-10 ASSESSMENT — REFRACTION_AUTOREFRACTION
OD_SPHERE: +1.00
OD_CYLINDER: -0.75
OD_AXIS: 066
OS_SPHERE: +0.25
OS_AXIS: 086
OS_CYLINDER: -0.50

## 2023-01-10 ASSESSMENT — REFRACTION_CURRENTRX
OS_OVR_VA: 20/
OS_CYLINDER: -0.25
OS_ADD: +3.25
OD_OVR_VA: 20/
OS_AXIS: 083
OS_SPHERE: -0.50
OD_ADD: +3.25
OD_AXIS: 024
OS_VPRISM_DIRECTION: BF
OD_VPRISM_DIRECTION: BF
OD_CYLINDER: -0.25
OD_SPHERE: +0.50

## 2023-01-10 ASSESSMENT — TONOMETRY
OD_IOP_MMHG: 14
OS_IOP_MMHG: 16

## 2023-01-10 ASSESSMENT — REFRACTION_MANIFEST
OS_VA1: 20/25
OD_SPHERE: +0.75
OD_CYLINDER: -0.25
OD_AXIS: 074
OS_SPHERE: +0.75
OD_VA1: 20/25
OS_CYLINDER: -0.25
OS_AXIS: 090

## 2023-01-10 ASSESSMENT — VISUAL ACUITY
OS_BCVA: 20/25-
OD_BCVA: 20/20-

## 2023-01-10 ASSESSMENT — CONFRONTATIONAL VISUAL FIELD TEST (CVF)
OD_FINDINGS: FULL
OS_FINDINGS: FULL

## 2023-01-16 NOTE — ED PROVIDER NOTE - ATTENDING CONTRIBUTION TO CARE
Tong: I performed a face to face bedside interview with patient regarding history of present illness, review of symptoms and past medical history. I completed an independent physical exam.  I have discussed patient's plan of care with resident.   I agree with note as stated above including HISTORY OF PRESENT ILLNESS, HIV, PAST MEDICAL/SURGICAL/FAMILY/SOCIAL HISTORY, ALLERGIES AND HOME MEDICATIONS, REVIEW OF SYSTEMS, PHYSICAL EXAM, MEDICAL DECISION MAKING and any PROGRESS NOTES during the time I functioned as the attending physician for this patient unless otherwise noted. My brief assessment is as follows: 76M h/o HTN, DM, MM p/w black stools x months. Has undergone negative colonoscopy/endoscopy. Is a jehovas witness and would not accept RBC transfusion. Feels tired. Deneis CP, SOB, palpitations, diarrhea, nausea, vomiting. Does often take pepto bismol.   Gen: Well appearing in NAD  Head: NC/AT  Neck: trachea midline  Resp:  No distress, CTAB  CV: RRR  GI: soft, NTND  Ext: no deformities  Neuro:  A&O appears non focal  Skin:  Warm and dry as visualized  Psych:  Normal affect and mood   Plan was for labs to assess anemia. Patient declined to wait for labs. Risks were discussed with patient. Was given GI follow up and strict return precautions. V-Y Plasty Text: The defect edges were debeveled with a #15 scalpel blade.  Given the location of the defect, shape of the defect and the proximity to free margins an V-Y advancement flap was deemed most appropriate.  Using a sterile surgical marker, an appropriate advancement flap was drawn incorporating the defect and placing the expected incisions within the relaxed skin tension lines where possible.    The area thus outlined was incised deep to adipose tissue with a #15 scalpel blade.  The skin margins were undermined to an appropriate distance in all directions utilizing iris scissors.

## 2023-09-13 NOTE — ED ADULT NURSE NOTE - PRO INTERPRETER NEED 2
Results will go straight to Dr. Tompkins in 2-3 business days.    Please call Interventional Radiology with any questions or concerns at [phone].  
English

## 2023-10-22 NOTE — ED ADULT NURSE NOTE - CHIEF COMPLAINT QUOTE
patient brought in by family states that he has been not feeling well for > 2 weeks states neck pain with recent biopsy, and abdominal pain with distention N/D > 1 week
Speaking Coherently

## 2024-01-15 ENCOUNTER — OFFICE (OUTPATIENT)
Dept: URBAN - METROPOLITAN AREA CLINIC 52 | Facility: CLINIC | Age: 79
Setting detail: OPHTHALMOLOGY
End: 2024-01-15
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
## 2024-01-15 PROCEDURE — NO SHOW FE NO SHOW FEE: Performed by: OPHTHALMOLOGY

## 2024-02-03 ENCOUNTER — EMERGENCY (EMERGENCY)
Facility: HOSPITAL | Age: 79
LOS: 1 days | Discharge: HOME | End: 2024-02-03
Attending: STUDENT IN AN ORGANIZED HEALTH CARE EDUCATION/TRAINING PROGRAM
Payer: MEDICARE

## 2024-02-03 VITALS
OXYGEN SATURATION: 98 % | HEART RATE: 61 BPM | DIASTOLIC BLOOD PRESSURE: 68 MMHG | RESPIRATION RATE: 18 BRPM | SYSTOLIC BLOOD PRESSURE: 156 MMHG

## 2024-02-03 VITALS
RESPIRATION RATE: 16 BRPM | HEART RATE: 65 BPM | WEIGHT: 182.98 LBS | SYSTOLIC BLOOD PRESSURE: 138 MMHG | DIASTOLIC BLOOD PRESSURE: 62 MMHG | HEIGHT: 69 IN | TEMPERATURE: 98 F | OXYGEN SATURATION: 99 %

## 2024-02-03 PROCEDURE — 99285 EMERGENCY DEPT VISIT HI MDM: CPT

## 2024-02-03 PROCEDURE — 71250 CT THORAX DX C-: CPT | Mod: 26,MD

## 2024-02-03 PROCEDURE — 70450 CT HEAD/BRAIN W/O DYE: CPT | Mod: 26,MD

## 2024-02-03 PROCEDURE — 72125 CT NECK SPINE W/O DYE: CPT | Mod: MD

## 2024-02-03 PROCEDURE — 73030 X-RAY EXAM OF SHOULDER: CPT

## 2024-02-03 PROCEDURE — 99284 EMERGENCY DEPT VISIT MOD MDM: CPT | Mod: 25

## 2024-02-03 PROCEDURE — 72125 CT NECK SPINE W/O DYE: CPT | Mod: 26,MD

## 2024-02-03 PROCEDURE — 71250 CT THORAX DX C-: CPT | Mod: MD

## 2024-02-03 PROCEDURE — 73030 X-RAY EXAM OF SHOULDER: CPT | Mod: 26,LT

## 2024-02-03 PROCEDURE — 70450 CT HEAD/BRAIN W/O DYE: CPT | Mod: MD

## 2024-02-03 NOTE — ED PROVIDER NOTE - OBJECTIVE STATEMENT
79 YOM pmh AFib on eliquis, CKD, DM2, multiple myeloma, and HTN sent from [facility] for fall out of bed. Patient endorsing hitting head and left shoulder and left chest. Denies LOC. Able to ambulate. Denies any preceding dizziness, chest pain. Denies fever, sob, abd pain, numbness, tingling ,weakness.

## 2024-02-03 NOTE — ED ADULT NURSE NOTE - NS ED NURSE DISCH DISPOSITION
I will STOP taking the medications listed below when I get home from the hospital:    Macrobid 100 mg oral capsule  -- 1 cap(s) by mouth 2 times a day   -- Finish all this medication unless otherwise directed by prescriber.  May discolor urine or feces.  Take with food or milk.
Discharged

## 2024-02-03 NOTE — ED ADULT NURSE NOTE - OBJECTIVE STATEMENT
Pt BIBA s/p fall in bedroom this am. Pt states he was walking around in his bedroom in [facility] and tripped on "something" in his room. Pt states he is currently in [facility] for syncope. Pt states he usually lives alone in [place].   Pt currently A&Ox3 at this time. Small abrasion present to L eyelid, bleeding controlled w/o pressure dressing.

## 2024-02-03 NOTE — ED ADULT TRIAGE NOTE - CHIEF COMPLAINT QUOTE
pt BIBA s/p slip and fall. pt landed on left side, hit head and c/o left shoulder and rib pain. abrasion noted to left eye brow. sensation intact, ambulatory. pt on eliquis. md @ bedside

## 2024-02-03 NOTE — ED PROVIDER NOTE - CARE PROVIDER_API CALL
Vega Merrill  Orthopaedic Surgery  [address] [place] [id number]  Phone: [phone]  Fax: [phone]  Follow Up Time: 4-6 Days

## 2024-02-03 NOTE — CHART NOTE - NSCHARTNOTEFT_GEN_A_CORE
SW Note: SW made aware pt is medically cleared for d/c, needs SW for return to [facility]. SW forwarded ED paperwork via ACM, pt accepted back, Transport set for next available via [facility] ([facility]) SW met w/ pt to make aware, transport letter and dc left with RN, SW signing off

## 2024-02-03 NOTE — ED PROVIDER NOTE - PATIENT PORTAL LINK FT
You can access the FollowMyHealth Patient Portal offered by [facility] by registering at the following website: [email or web address]. By joining [email or web address]’s FollowMyHealth portal, you will also be able to view your health information using other applications (apps) compatible with our system.

## 2024-02-03 NOTE — ED PROVIDER NOTE - PHYSICAL EXAMINATION
Vital Signs per nursing documentation  Gen: well appearing, no acute distress  HEENT: NCAT, MMM, small abrasion to left orbit region. no bony tenderness  Cardiac: regular rate rhythm, normal S1S2  Chest: clear to auscultation bilateral, no wheezes or crackles  Abdomen: soft, non tender non distended  Extremity: no gross deformity, good perfusion  Skin: no bruising  Neuro: nonfocal neuro exam, gait steady

## 2024-08-22 NOTE — ED PROVIDER NOTE - WR ORDER NAME 1
Patient:  David Gold  YOB: 1947  Date of Visit:  08/22/24        Millie Sauer DO  [address] [place] [address]    Dear Millie Sauer DO    This 76 July 10, 2002.  Gamma knife radiosurgery April 25, Benign essential hypertension   • Benign neoplasm of infratentorial region of brain  (CMD)   • Benign neoplasm of spinal cord  (CMD)   • Chronic kidney disease, stage 3  (CMD)   • Cigar smoker   • COPD, moderate  (CMD)   • Degeneration of cervical intervertebral disc   • History of colon polyps   • Postoperative hypothyroidism   • Rotator cuff syndrome of left shoulder   • Trigeminal neuralgia   • Dyspepsia   • Colon cancer screening   • Dyspnea on exertion   • Brain tumor (benign)  (CMD)   • Schwannoma   • COPD (chronic obstructive pulmonary disease)  (CMD)   • Kidney damage   • Lump in chest   • Bilateral lower extremity edema   • Chronic pain of right knee   • Blepharitis of upper eyelids of both eyes   • Irritable bowel syndrome with both constipation and diarrhea   • Routine history and physical examination of adult   • Atherosclerosis   • Class 1 obesity due to excess calories with serious comorbidity and body mass index (BMI) of 33.0 to 33.9 in adult   • Rash   • Lung nodules   • Mass of left breast       Past Medical History:   Diagnosis Date   • Brain tumor (benign)  (CMD)    • COPD (chronic obstructive pulmonary disease)  (CMD)    • Dry eyes    • Floppy eyelid syndrome     left eye   • Kidney damage    • Schwannoma        Past Surgical History:   Procedure Laterality Date   • Chalazion excision Left 08/07/2023    LISBETH   • Third ventriculostomy         Family History   Problem Relation Age of Onset   • Patient is unaware of any medical problems Mother         heart defect   • Patient is unaware of any medical problems Father         strokes X2       Social History     Socioeconomic History   • Marital status: /Civil Union     Spouse name: Not on file   • Number of children: Not on file   • Years of education: Not on file   • Highest education level: Not on file   Occupational History   • Not on file   Tobacco Use   • Smoking status: Every Day     Types: Pipe   • Smokeless tobacco: Never   • Tobacco  comments:     Pipe & 3-4 cigars daily - 8/8/2024   Vaping Use   • Vaping status: never used   Substance and Sexual Activity   • Alcohol use: Yes     Comment: ocasionaly   • Drug use: No   • Sexual activity: Not on file   Other Topics Concern   • Not on file   Social History Narrative   • Not on file     Social Determinants of Health     Financial Resource Strain: Low Risk  (7/17/2024)    Financial Resource Strain    • Unable to Get: None   Food Insecurity: Low Risk  (7/17/2024)    Food Insecurity    • Worried about Food: Never true    • Food is Gone: Never true   Transportation Needs: Not At Risk (7/17/2024)    Transportation Needs    • Lack of Reliable Transportation: No   Physical Activity: High Risk (7/17/2024)    Exercise Vital Sign    • Days of Exercise per Week: 0 days    • Minutes of Exercise per Session: 0 min   Stress: Medium Risk (7/17/2024)    Stress    • How Stressed: Somewhat   Social Connections: Low Risk  (7/17/2024)    Social Connections    • Social Connectivity: 5 or more times a week   Interpersonal Safety: Not on file       Preferred language is English [1].    ALLERGIES:  No Known Allergies    Review of Systems   Constitutional: Negative.    HENT: Negative.     Eyes:  Positive for visual disturbance.   Respiratory: Negative.     Cardiovascular: Negative.    Gastrointestinal: Negative.    Endocrine: Negative.    Genitourinary: Negative.    Musculoskeletal: Negative.    Skin: Negative.    Allergic/Immunologic: Negative.    Neurological:  Positive for numbness.        See HPI   Hematological: Negative.    Psychiatric/Behavioral: Negative.         There were no vitals filed for this visit.    Neurologic Exam     Mental Status   Oriented to person, place, and time.     Cranial Nerves     CN IX, X   CN IX normal.   Will asymmetry largely due to ptosis of the left eye.  Facial motor otherwise appears to be nearly symmetric.  He has some sensory dysfunction on the left side of his face significant in the  second division but also the first division of the trigeminal nerve.     Motor Exam   Muscle bulk: normal    Strength   Strength 5/5 throughout.     Sensory Exam   Light touch normal.     Gait, Coordination, and Reflexes     Gait  Gait: iaus-[id number] finger-nose-finger dysmetria.  Mild gait ataxia.       Assessment/Plan:  Is my assessment that neurologically he is stable and radiographically the MRI is unchanged over 2 years.  I went over some of the findings predominantly the cervical stenosis and lumbar stenosis and cautioned him about activity.  The risks to him of these conditions and also encouraged weight loss and reconditioning.  I will see him again in 1 year and follow-up with imaging and 2 years or with any clinical concerns.  He is also being followed by cardiothoracic for nodules noted in the pulmonary system with another CT scan in 3 months.    Current Outpatient Medications   Medication Sig Dispense Refill   • mirtazapine (REMERON) 45 MG tablet Take 45 mg by mouth nightly.     • Anoro Ellipta 62.5-25 MCG/ACT inhaler INHALE 1 PUFF BY MOUTH EVERY DAY 60 each 0   • simvastatin (ZOCOR) 40 MG tablet Take 1 tablet by mouth nightly. 90 tablet 0   • nystatin (MYCOSTATIN) 434140 UNIT/GM cream Apply topically 2 times daily.     • levothyroxine 100 MCG tablet Take 1 tablet by mouth daily. 90 tablet 1   • lamoTRIgine (LaMICtal) 100 MG tablet TAKE 1 TABLET BY MOUTH EVERY DAY 90 tablet 0   • LORazepam (ATIVAN) 0.5 MG tablet Take 1 tablet by mouth 2 times daily as needed for Anxiety. 30 tablet 0   • VITAMIN D PO      • sildenafil (VIAGRA) 50 MG tablet Take 1 tablet by mouth daily as needed for Erectile Dysfunction. 10 tablet 1   • furosemide (LASIX) 20 MG tablet Take 20 mg by mouth 2 times daily.     • losartan (COZAAR) 100 MG tablet Take 1 tablet by mouth daily.      • amLODIPine (NORVASC) 5 MG tablet TAKE 1 TABLET DAILY       No current facility-administered medications for this visit.       No follow-ups on  file.    A copy of this consultation has been sent electronically to:   Dr. Sauer, Millie PUENTE, DO  [address] [place] [address]           Xray Shoulder 2 Views, Left

## 2025-04-23 NOTE — ED ADULT NURSE NOTE - NS ED NURSE DC INFO COMPLEXITY
8.) During the past 4 weeks, has your physical and emotional health limited your social activities with family, friends, neighbors, or other groups?: Moderately     11j.) Driven/Ridden in a car without wearing your seatbelt: Seldom         Simple: Patient demonstrates quick and easy understanding/Verbalized Understanding

## 2025-06-17 NOTE — H&P ADULT - HISTORY OF PRESENT ILLNESS
74 year old male with DM 2, paroxysmal AF (on Apixaban), hypertension, CKD (Cr 1.63-2.65), hyperparathyroidism, multiple myeloma s/p stem cell transplant/XRT, sickle cell trait, prostate cancer status post surgery who presented with syncope and bradycardia. The patient states he woke up this morning and was sitting at the edge of the bed. He took his medications and the next thing he remembers is waking up on the floor with EMS assessing him. He denies having any chest pain, palpitations, dizziness, lightheadedness, dyspnea, fevers, chills, cough, nausea, vomiting, diarrhea, dysuria, hematuria, hematochezia, or melena. He does note that over the past three days he has had increased fatigue and tiredness. He manages his medications on his own and has them in a pill box. He does not feel he has taken any more medications than he is prescribed. At his house, ECG by EMS reveals atrial flutter with slow ventricular response with HR to the 30-40s. He was given IV Atropine 0.5mg with minimal effect on his rate. Upon arrival to the ER, he was noted to be in AF with slow ventricular response and was given an additional IV Atropine 0.5mg x1 with minimal improvement in HR to the 50s. Currently pt's BP is 119/50 & HR is 63. Currently denying any symptoms. Pt is on Nifedipine & metoprolol at home & denies any med changes recently. Admits taking extra tizanidine & oxycodone prior to the event due to body ache. 8